# Patient Record
Sex: MALE | Race: BLACK OR AFRICAN AMERICAN | NOT HISPANIC OR LATINO | Employment: UNEMPLOYED | URBAN - METROPOLITAN AREA
[De-identification: names, ages, dates, MRNs, and addresses within clinical notes are randomized per-mention and may not be internally consistent; named-entity substitution may affect disease eponyms.]

---

## 2018-10-23 ENCOUNTER — OFFICE VISIT (OUTPATIENT)
Dept: FAMILY MEDICINE CLINIC | Facility: CLINIC | Age: 38
End: 2018-10-23
Payer: COMMERCIAL

## 2018-10-23 VITALS
OXYGEN SATURATION: 98 % | SYSTOLIC BLOOD PRESSURE: 130 MMHG | HEIGHT: 73 IN | DIASTOLIC BLOOD PRESSURE: 80 MMHG | RESPIRATION RATE: 16 BRPM | BODY MASS INDEX: 24.92 KG/M2 | WEIGHT: 188 LBS | HEART RATE: 74 BPM

## 2018-10-23 DIAGNOSIS — B07.0 PLANTAR WART OF BOTH FEET: Primary | ICD-10-CM

## 2018-10-23 DIAGNOSIS — Z00.00 WELL ADULT EXAM: ICD-10-CM

## 2018-10-23 PROBLEM — M54.6 CHRONIC RIGHT-SIDED THORACIC BACK PAIN: Status: ACTIVE | Noted: 2017-08-04

## 2018-10-23 PROBLEM — G89.29 CHRONIC RIGHT-SIDED THORACIC BACK PAIN: Status: ACTIVE | Noted: 2017-08-04

## 2018-10-23 PROCEDURE — 3008F BODY MASS INDEX DOCD: CPT | Performed by: FAMILY MEDICINE

## 2018-10-23 PROCEDURE — 99204 OFFICE O/P NEW MOD 45 MIN: CPT | Performed by: FAMILY MEDICINE

## 2018-10-23 RX ORDER — MELOXICAM 7.5 MG/1
7.5 TABLET ORAL DAILY
COMMUNITY
Start: 2018-01-31 | End: 2019-07-18 | Stop reason: ALTCHOICE
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
## 2018-12-13 ENCOUNTER — OFFICE VISIT (OUTPATIENT)
Dept: FAMILY MEDICINE CLINIC | Facility: CLINIC | Age: 38
End: 2018-12-13
Payer: COMMERCIAL

## 2018-12-13 VITALS
DIASTOLIC BLOOD PRESSURE: 80 MMHG | HEIGHT: 73 IN | SYSTOLIC BLOOD PRESSURE: 150 MMHG | BODY MASS INDEX: 24.65 KG/M2 | TEMPERATURE: 98.2 F | WEIGHT: 186 LBS

## 2018-12-13 DIAGNOSIS — J06.9 URTI (ACUTE UPPER RESPIRATORY INFECTION): Primary | ICD-10-CM

## 2018-12-13 PROCEDURE — 99213 OFFICE O/P EST LOW 20 MIN: CPT | Performed by: FAMILY MEDICINE

## 2018-12-13 PROCEDURE — 3008F BODY MASS INDEX DOCD: CPT | Performed by: FAMILY MEDICINE

## 2018-12-13 RX ORDER — CEFUROXIME AXETIL 500 MG/1
500 TABLET ORAL EVERY 12 HOURS SCHEDULED
Qty: 14 TABLET | Refills: 0 | Status: SHIPPED | OUTPATIENT
Start: 2018-12-13 | End: 2018-12-20
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
## 2019-07-18 ENCOUNTER — OFFICE VISIT (OUTPATIENT)
Dept: FAMILY MEDICINE CLINIC | Facility: CLINIC | Age: 39
End: 2019-07-18
Payer: COMMERCIAL

## 2019-07-18 VITALS
OXYGEN SATURATION: 98 % | RESPIRATION RATE: 16 BRPM | HEIGHT: 73 IN | SYSTOLIC BLOOD PRESSURE: 118 MMHG | DIASTOLIC BLOOD PRESSURE: 72 MMHG | TEMPERATURE: 99.7 F | BODY MASS INDEX: 25.69 KG/M2 | HEART RATE: 68 BPM | WEIGHT: 193.8 LBS

## 2019-07-18 DIAGNOSIS — L30.9 DERMATITIS: ICD-10-CM

## 2019-07-18 DIAGNOSIS — S61.411A LACERATION OF RIGHT HAND WITHOUT FOREIGN BODY, INITIAL ENCOUNTER: Primary | ICD-10-CM

## 2019-07-18 PROCEDURE — 3008F BODY MASS INDEX DOCD: CPT | Performed by: FAMILY MEDICINE

## 2019-07-18 PROCEDURE — 99214 OFFICE O/P EST MOD 30 MIN: CPT | Performed by: FAMILY MEDICINE

## 2019-07-18 RX ORDER — BETAMETHASONE DIPROPIONATE 0.5 MG/G
OINTMENT TOPICAL 2 TIMES DAILY
COMMUNITY
End: 2019-07-18 | Stop reason: SDUPTHER

## 2019-07-18 RX ORDER — CEPHALEXIN 500 MG/1
500 CAPSULE ORAL EVERY 12 HOURS SCHEDULED
Qty: 10 CAPSULE | Refills: 0 | Status: SHIPPED | OUTPATIENT
Start: 2019-07-18 | End: 2019-07-23

## 2019-07-18 RX ORDER — BETAMETHASONE DIPROPIONATE 0.5 MG/G
CREAM TOPICAL 2 TIMES DAILY
Qty: 45 G | Refills: 0 | Status: SHIPPED | OUTPATIENT
Start: 2019-07-18 | End: 2021-12-08 | Stop reason: ALTCHOICE

## 2019-07-18 RX ORDER — GABAPENTIN 100 MG/1
300 CAPSULE ORAL
COMMUNITY
End: 2021-12-08 | Stop reason: ALTCHOICE

## 2019-07-18 NOTE — LETTER
July 18, 2019     Patient: Crista Hawk   YOB: 1980   Date of Visit: 7/18/2019       To Whom it May Concern:    Crista Hawk is under my professional care  He was seen in my office on 7/18/2019  He may return to work on July 19, 2019  If you have any questions or concerns, please don't hesitate to call           Sincerely,          Jennifer Montilla, DO        CC: No Recipients

## 2019-07-18 NOTE — PROGRESS NOTES
Assessment/Plan:  Applied Betadine and sterile dressing to the wound  Home instructions given  He will follow up here if not a lot better in 5 days  Diagnoses and all orders for this visit:    Laceration of right hand without foreign body, initial encounter    Other orders  -     gabapentin (NEURONTIN) 100 mg capsule; Take 300 mg by mouth daily at bedtime  -     betamethasone, augmented, (DIPROLENE) 0 05 % ointment; Apply topically 2 (two) times a day          Subjective:      Patient ID: Jonny Bautista is a 41 y o  male  Patient presents with:  Laceration: Patient presents to the office today for c/o a laceration on his right hand with scissors on Sunday  Patient has been treating at home  No pain, until the past 24 hours and he noticed swelling, and tighness  Rash: Patient also here for c/o rash, he feels it is heat rash  He has had this before and would like a script for Betamethasone Ointment  He has had this in the past but is out now  Health Maintenance: Chart says he is due for Pneumo Vaccine and Tetanus shot  The following portions of the patient's history were reviewed and updated as appropriate: allergies, current medications, past family history, past medical history, past social history, past surgical history and problem list     Review of Systems   Constitutional: Negative  HENT: Negative  Eyes: Negative  Respiratory: Negative  Cardiovascular: Negative  Gastrointestinal: Negative  Endocrine: Negative  Genitourinary: Negative  Musculoskeletal: Negative  Skin: Positive for wound  Allergic/Immunologic: Negative  Neurological: Negative  Hematological: Negative  Psychiatric/Behavioral: Negative  All other systems reviewed and are negative          Objective:      /72 (BP Location: Left arm, Patient Position: Sitting, Cuff Size: Adult)   Pulse 68   Temp 99 7 °F (37 6 °C) (Tympanic)   Resp 16   Ht 6' 1" (1 854 m)   Wt 87 9 kg (193 lb 12 8 oz)   SpO2 98%   BMI 25 57 kg/m²          Physical Exam   Musculoskeletal:   There is swelling but no tenderness in the right hand  There is laceration over the hyper thenar eminence of the right hand  No erythema no discharge

## 2019-07-22 RX ORDER — BETAMETHASONE DIPROPIONATE 0.5 MG/G
OINTMENT TOPICAL 2 TIMES DAILY
Qty: 30 G | Refills: 1 | Status: SHIPPED | OUTPATIENT
Start: 2019-07-22 | End: 2021-12-08 | Stop reason: ALTCHOICE

## 2020-03-31 ENCOUNTER — TELEPHONE (OUTPATIENT)
Dept: FAMILY MEDICINE CLINIC | Facility: CLINIC | Age: 40
End: 2020-03-31

## 2020-03-31 ENCOUNTER — TELEMEDICINE (OUTPATIENT)
Dept: FAMILY MEDICINE CLINIC | Facility: CLINIC | Age: 40
End: 2020-03-31
Payer: COMMERCIAL

## 2020-03-31 DIAGNOSIS — B34.9 VIRAL SYNDROME: Primary | ICD-10-CM

## 2020-03-31 PROCEDURE — 99213 OFFICE O/P EST LOW 20 MIN: CPT | Performed by: FAMILY MEDICINE

## 2020-03-31 NOTE — LETTER
March 31, 2020     Patient: Urvashi Valderrama   YOB: 1980   Date of Visit: 3/31/2020       To Whom it May Concern:    Urvashi Valderrama is under my professional care  He was seen in my office on 3/31/2020  He may return to work on Tuesday April 7, 2020  If you have any questions or concerns, please don't hesitate to call           Sincerely,          Charmayne Altes,         CC: No Recipients

## 2020-03-31 NOTE — TELEPHONE ENCOUNTER
COVID-19 Phone Call Triage    Yin Erm called stating that they are having SOB, Fatigue, Body Aches and Chills  Yin De Los Santos has not traveled outside the [place]  within the last 14 days  Latrell Lehman has not been around any one ill or exposed to COVID-19  Please call patient to triage
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
## 2020-04-23 ENCOUNTER — TELEMEDICINE (OUTPATIENT)
Dept: FAMILY MEDICINE CLINIC | Facility: CLINIC | Age: 40
End: 2020-04-23
Payer: COMMERCIAL

## 2020-04-23 DIAGNOSIS — Z20.828 EXPOSURE TO SARS-ASSOCIATED CORONAVIRUS: ICD-10-CM

## 2020-04-23 DIAGNOSIS — B34.9 VIRAL SYNDROME: Primary | ICD-10-CM

## 2020-04-23 PROCEDURE — 87635 SARS-COV-2 COVID-19 AMP PRB: CPT

## 2020-04-23 PROCEDURE — 99212 OFFICE O/P EST SF 10 MIN: CPT | Performed by: FAMILY MEDICINE

## 2020-04-23 RX ORDER — FLUTICASONE PROPIONATE 50 MCG
2 SPRAY, SUSPENSION (ML) NASAL DAILY
Qty: 1 BOTTLE | Refills: 0 | Status: SHIPPED | OUTPATIENT
Start: 2020-04-23 | End: 2020-05-19

## 2020-04-23 RX ORDER — ALBUTEROL SULFATE 90 UG/1
2 AEROSOL, METERED RESPIRATORY (INHALATION) EVERY 4 HOURS PRN
Qty: 1 INHALER | Refills: 0 | Status: SHIPPED | OUTPATIENT
Start: 2020-04-23 | End: 2021-12-08 | Stop reason: ALTCHOICE

## 2020-04-24 LAB — SARS-COV-2 RNA SPEC QL NAA+PROBE: NOT DETECTED

## 2020-05-17 DIAGNOSIS — B34.9 VIRAL SYNDROME: ICD-10-CM

## 2020-05-19 RX ORDER — FLUTICASONE PROPIONATE 50 MCG
SPRAY, SUSPENSION (ML) NASAL
Qty: 16 ML | Refills: 0 | Status: SHIPPED | OUTPATIENT
Start: 2020-05-19 | End: 2021-12-08 | Stop reason: ALTCHOICE

## 2020-10-13 ENCOUNTER — OFFICE VISIT (OUTPATIENT)
Dept: FAMILY MEDICINE CLINIC | Facility: CLINIC | Age: 40
End: 2020-10-13
Payer: COMMERCIAL

## 2020-10-13 VITALS
BODY MASS INDEX: 25.89 KG/M2 | WEIGHT: 196.2 LBS | HEART RATE: 76 BPM | DIASTOLIC BLOOD PRESSURE: 82 MMHG | SYSTOLIC BLOOD PRESSURE: 132 MMHG

## 2020-10-13 DIAGNOSIS — R53.83 FATIGUE, UNSPECIFIED TYPE: ICD-10-CM

## 2020-10-13 DIAGNOSIS — J30.2 SEASONAL ALLERGIES: ICD-10-CM

## 2020-10-13 DIAGNOSIS — Z00.00 WELL ADULT EXAM: Primary | ICD-10-CM

## 2020-10-13 DIAGNOSIS — N42.9 PROSTATE DISORDER: ICD-10-CM

## 2020-10-13 PROBLEM — B34.9 VIRAL SYNDROME: Status: RESOLVED | Noted: 2020-03-31 | Resolved: 2020-10-13

## 2020-10-13 PROBLEM — J06.9 URTI (ACUTE UPPER RESPIRATORY INFECTION): Status: RESOLVED | Noted: 2018-12-13 | Resolved: 2020-10-13

## 2020-10-13 PROBLEM — M54.6 CHRONIC RIGHT-SIDED THORACIC BACK PAIN: Status: RESOLVED | Noted: 2017-08-04 | Resolved: 2020-10-13

## 2020-10-13 PROBLEM — S61.411A LACERATION OF RIGHT HAND WITHOUT FOREIGN BODY: Status: RESOLVED | Noted: 2019-07-18 | Resolved: 2020-10-13

## 2020-10-13 PROBLEM — L30.9 DERMATITIS: Status: RESOLVED | Noted: 2019-07-18 | Resolved: 2020-10-13

## 2020-10-13 PROBLEM — G89.29 CHRONIC RIGHT-SIDED THORACIC BACK PAIN: Status: RESOLVED | Noted: 2017-08-04 | Resolved: 2020-10-13

## 2020-10-13 PROCEDURE — 99214 OFFICE O/P EST MOD 30 MIN: CPT | Performed by: FAMILY MEDICINE

## 2020-10-13 PROCEDURE — 4004F PT TOBACCO SCREEN RCVD TLK: CPT | Performed by: FAMILY MEDICINE

## 2020-10-13 PROCEDURE — 3725F SCREEN DEPRESSION PERFORMED: CPT | Performed by: FAMILY MEDICINE

## 2020-10-14 ENCOUNTER — APPOINTMENT (OUTPATIENT)
Dept: LAB | Facility: HOSPITAL | Age: 40
End: 2020-10-14
Payer: COMMERCIAL

## 2020-10-14 DIAGNOSIS — J30.2 SEASONAL ALLERGIES: ICD-10-CM

## 2020-10-14 LAB
ALBUMIN SERPL BCP-MCNC: 4.2 G/DL (ref 3.5–5)
ALP SERPL-CCNC: 88 U/L (ref 46–116)
ALT SERPL W P-5'-P-CCNC: 37 U/L (ref 12–78)
ANION GAP SERPL CALCULATED.3IONS-SCNC: 7 MMOL/L (ref 4–13)
AST SERPL W P-5'-P-CCNC: 21 U/L (ref 5–45)
BASOPHILS # BLD AUTO: 0.06 THOUSANDS/ΜL (ref 0–0.1)
BASOPHILS NFR BLD AUTO: 1 % (ref 0–1)
BILIRUB SERPL-MCNC: 0.4 MG/DL (ref 0.2–1)
BILIRUB UR QL STRIP: NEGATIVE
BUN SERPL-MCNC: 14 MG/DL (ref 5–25)
CALCIUM SERPL-MCNC: 9.4 MG/DL (ref 8.3–10.1)
CHLORIDE SERPL-SCNC: 104 MMOL/L (ref 100–108)
CHOLEST SERPL-MCNC: 197 MG/DL (ref 50–200)
CLARITY UR: NORMAL
CO2 SERPL-SCNC: 27 MMOL/L (ref 21–32)
COLOR UR: YELLOW
CREAT SERPL-MCNC: 1.24 MG/DL (ref 0.6–1.3)
EOSINOPHIL # BLD AUTO: 0.63 THOUSAND/ΜL (ref 0–0.61)
EOSINOPHIL NFR BLD AUTO: 8 % (ref 0–6)
ERYTHROCYTE [DISTWIDTH] IN BLOOD BY AUTOMATED COUNT: 12.6 % (ref 11.6–15.1)
FERRITIN SERPL-MCNC: 102 NG/ML (ref 8–388)
GFR SERPL CREATININE-BSD FRML MDRD: 84 ML/MIN/1.73SQ M
GLUCOSE P FAST SERPL-MCNC: 102 MG/DL (ref 65–99)
GLUCOSE UR STRIP-MCNC: NEGATIVE MG/DL
HCT VFR BLD AUTO: 42.2 % (ref 36.5–49.3)
HDLC SERPL-MCNC: 45 MG/DL
HGB BLD-MCNC: 13.9 G/DL (ref 12–17)
HGB UR QL STRIP.AUTO: NEGATIVE
IMM GRANULOCYTES # BLD AUTO: 0.03 THOUSAND/UL (ref 0–0.2)
IMM GRANULOCYTES NFR BLD AUTO: 0 % (ref 0–2)
IRON SERPL-MCNC: 70 UG/DL (ref 65–175)
KETONES UR STRIP-MCNC: NEGATIVE MG/DL
LDLC SERPL CALC-MCNC: 132 MG/DL (ref 0–100)
LEUKOCYTE ESTERASE UR QL STRIP: NEGATIVE
LYMPHOCYTES # BLD AUTO: 2.55 THOUSANDS/ΜL (ref 0.6–4.47)
LYMPHOCYTES NFR BLD AUTO: 31 % (ref 14–44)
MCH RBC QN AUTO: 31.2 PG (ref 26.8–34.3)
MCHC RBC AUTO-ENTMCNC: 32.9 G/DL (ref 31.4–37.4)
MCV RBC AUTO: 95 FL (ref 82–98)
MONOCYTES # BLD AUTO: 0.55 THOUSAND/ΜL (ref 0.17–1.22)
MONOCYTES NFR BLD AUTO: 7 % (ref 4–12)
NEUTROPHILS # BLD AUTO: 4.37 THOUSANDS/ΜL (ref 1.85–7.62)
NEUTS SEG NFR BLD AUTO: 53 % (ref 43–75)
NITRITE UR QL STRIP: NEGATIVE
NONHDLC SERPL-MCNC: 152 MG/DL
NRBC BLD AUTO-RTO: 0 /100 WBCS
PH UR STRIP.AUTO: 6 [PH]
PLATELET # BLD AUTO: 230 THOUSANDS/UL (ref 149–390)
PMV BLD AUTO: 10.7 FL (ref 8.9–12.7)
POTASSIUM SERPL-SCNC: 4.2 MMOL/L (ref 3.5–5.3)
PROT SERPL-MCNC: 7.7 G/DL (ref 6.4–8.2)
PROT UR STRIP-MCNC: NEGATIVE MG/DL
PSA SERPL-MCNC: 1.5 NG/ML (ref 0–4)
RBC # BLD AUTO: 4.45 MILLION/UL (ref 3.88–5.62)
SODIUM SERPL-SCNC: 138 MMOL/L (ref 136–145)
SP GR UR STRIP.AUTO: 1.02 (ref 1–1.03)
TIBC SERPL-MCNC: 294 UG/DL (ref 250–450)
TRIGL SERPL-MCNC: 101 MG/DL
TSH SERPL DL<=0.05 MIU/L-ACNC: 1.78 UIU/ML (ref 0.36–3.74)
UROBILINOGEN UR QL STRIP.AUTO: 0.2 E.U./DL
WBC # BLD AUTO: 8.19 THOUSAND/UL (ref 4.31–10.16)

## 2020-10-14 PROCEDURE — 82785 ASSAY OF IGE: CPT

## 2020-10-14 PROCEDURE — 82728 ASSAY OF FERRITIN: CPT

## 2020-10-14 PROCEDURE — 83540 ASSAY OF IRON: CPT

## 2020-10-14 PROCEDURE — 80061 LIPID PANEL: CPT | Performed by: FAMILY MEDICINE

## 2020-10-14 PROCEDURE — 86003 ALLG SPEC IGE CRUDE XTRC EA: CPT

## 2020-10-14 PROCEDURE — 36415 COLL VENOUS BLD VENIPUNCTURE: CPT | Performed by: FAMILY MEDICINE

## 2020-10-14 PROCEDURE — 84153 ASSAY OF PSA TOTAL: CPT | Performed by: FAMILY MEDICINE

## 2020-10-14 PROCEDURE — 85025 COMPLETE CBC W/AUTO DIFF WBC: CPT | Performed by: FAMILY MEDICINE

## 2020-10-14 PROCEDURE — 80053 COMPREHEN METABOLIC PANEL: CPT | Performed by: FAMILY MEDICINE

## 2020-10-14 PROCEDURE — 81003 URINALYSIS AUTO W/O SCOPE: CPT | Performed by: FAMILY MEDICINE

## 2020-10-14 PROCEDURE — 83550 IRON BINDING TEST: CPT

## 2020-10-14 PROCEDURE — 84443 ASSAY THYROID STIM HORMONE: CPT | Performed by: FAMILY MEDICINE

## 2020-10-15 LAB
A ALTERNATA IGE QN: <0.1 KUA/I
A FUMIGATUS IGE QN: 0.24 KUA/I
ALLERGEN COMMENT: ABNORMAL
BERMUDA GRASS IGE QN: 0.27 KUA/I
BOXELDER IGE QN: <0.1 KUA/I
C HERBARUM IGE QN: <0.1 KUA/I
CAT DANDER IGE QN: <0.1 KUA/I
CMN PIGWEED IGE QN: <0.1 KUA/I
COMMON RAGWEED IGE QN: 1.79 KUA/I
COTTONWOOD IGE QN: 0.11 KUA/I
D FARINAE IGE QN: 1.15 KUA/I
D PTERONYSS IGE QN: 0.63 KUA/I
DOG DANDER IGE QN: 0.12 KUA/I
LONDON PLANE IGE QN: 0.15 KUA/I
MOUSE URINE PROT IGE QN: <0.1 KUA/I
MT JUNIPER IGE QN: <0.1 KUA/I
MUGWORT IGE QN: 1.17 KUA/I
P NOTATUM IGE QN: <0.1 KUA/I
ROACH IGE QN: 1.66 KUA/I
SHEEP SORREL IGE QN: <0.1 KUA/I
SILVER BIRCH IGE QN: <0.1 KUA/I
TIMOTHY IGE QN: 3.74 KUA/I
TOTAL IGE SMQN RAST: 106 KU/L (ref 0–113)
WALNUT IGE QN: 0.55 KUA/I
WHITE ASH IGE QN: 0.2 KUA/I
WHITE ELM IGE QN: 0.17 KUA/I
WHITE MULBERRY IGE QN: <0.1 KUA/I
WHITE OAK IGE QN: <0.1 KUA/I

## 2020-10-22 ENCOUNTER — TELEPHONE (OUTPATIENT)
Dept: FAMILY MEDICINE CLINIC | Facility: CLINIC | Age: 40
End: 2020-10-22

## 2020-10-22 DIAGNOSIS — Z00.00 WELL ADULT EXAM: Primary | ICD-10-CM

## 2021-12-08 ENCOUNTER — OFFICE VISIT (OUTPATIENT)
Dept: FAMILY MEDICINE CLINIC | Facility: CLINIC | Age: 41
End: 2021-12-08
Payer: COMMERCIAL

## 2021-12-08 ENCOUNTER — TELEPHONE (OUTPATIENT)
Dept: FAMILY MEDICINE CLINIC | Facility: CLINIC | Age: 41
End: 2021-12-08

## 2021-12-08 ENCOUNTER — APPOINTMENT (OUTPATIENT)
Dept: LAB | Facility: HOSPITAL | Age: 41
End: 2021-12-08
Payer: COMMERCIAL

## 2021-12-08 ENCOUNTER — TELEPHONE (OUTPATIENT)
Dept: PSYCHIATRY | Facility: CLINIC | Age: 41
End: 2021-12-08

## 2021-12-08 VITALS
TEMPERATURE: 96.9 F | HEIGHT: 73 IN | HEART RATE: 55 BPM | OXYGEN SATURATION: 98 % | BODY MASS INDEX: 26.51 KG/M2 | SYSTOLIC BLOOD PRESSURE: 110 MMHG | DIASTOLIC BLOOD PRESSURE: 80 MMHG | WEIGHT: 200 LBS

## 2021-12-08 DIAGNOSIS — Z11.4 SCREENING FOR HIV (HUMAN IMMUNODEFICIENCY VIRUS): ICD-10-CM

## 2021-12-08 DIAGNOSIS — Z12.11 SCREENING FOR COLORECTAL CANCER: ICD-10-CM

## 2021-12-08 DIAGNOSIS — Z12.12 SCREENING FOR COLORECTAL CANCER: ICD-10-CM

## 2021-12-08 DIAGNOSIS — F33.1 MODERATE EPISODE OF RECURRENT MAJOR DEPRESSIVE DISORDER (HCC): ICD-10-CM

## 2021-12-08 DIAGNOSIS — Z00.00 ANNUAL PHYSICAL EXAM: Primary | ICD-10-CM

## 2021-12-08 DIAGNOSIS — Z11.59 NEED FOR HEPATITIS C SCREENING TEST: ICD-10-CM

## 2021-12-08 DIAGNOSIS — Z00.00 ANNUAL PHYSICAL EXAM: ICD-10-CM

## 2021-12-08 LAB
25(OH)D3 SERPL-MCNC: 11.2 NG/ML (ref 30–100)
ALBUMIN SERPL BCP-MCNC: 4.7 G/DL (ref 3.5–5)
ALP SERPL-CCNC: 81 U/L (ref 46–116)
ALT SERPL W P-5'-P-CCNC: 33 U/L (ref 12–78)
ANION GAP SERPL CALCULATED.3IONS-SCNC: 10 MMOL/L (ref 4–13)
AST SERPL W P-5'-P-CCNC: 16 U/L (ref 5–45)
BILIRUB SERPL-MCNC: 0.42 MG/DL (ref 0.2–1)
BUN SERPL-MCNC: 11 MG/DL (ref 5–25)
CALCIUM SERPL-MCNC: 9.2 MG/DL (ref 8.3–10.1)
CHLORIDE SERPL-SCNC: 103 MMOL/L (ref 100–108)
CHOLEST SERPL-MCNC: 202 MG/DL
CO2 SERPL-SCNC: 26 MMOL/L (ref 21–32)
CREAT SERPL-MCNC: 1.21 MG/DL (ref 0.6–1.3)
ERYTHROCYTE [DISTWIDTH] IN BLOOD BY AUTOMATED COUNT: 12.5 % (ref 11.6–15.1)
GFR SERPL CREATININE-BSD FRML MDRD: 86 ML/MIN/1.73SQ M
GLUCOSE P FAST SERPL-MCNC: 97 MG/DL (ref 65–99)
HCT VFR BLD AUTO: 42.4 % (ref 36.5–49.3)
HCV AB SER QL: NORMAL
HDLC SERPL-MCNC: 35 MG/DL
HGB BLD-MCNC: 14.4 G/DL (ref 12–17)
LDLC SERPL CALC-MCNC: 133 MG/DL (ref 0–100)
MCH RBC QN AUTO: 32.1 PG (ref 26.8–34.3)
MCHC RBC AUTO-ENTMCNC: 34 G/DL (ref 31.4–37.4)
MCV RBC AUTO: 94 FL (ref 82–98)
NONHDLC SERPL-MCNC: 167 MG/DL
PLATELET # BLD AUTO: 213 THOUSANDS/UL (ref 149–390)
PMV BLD AUTO: 10.7 FL (ref 8.9–12.7)
POTASSIUM SERPL-SCNC: 4 MMOL/L (ref 3.5–5.3)
PROT SERPL-MCNC: 7.7 G/DL (ref 6.4–8.2)
RBC # BLD AUTO: 4.49 MILLION/UL (ref 3.88–5.62)
SODIUM SERPL-SCNC: 139 MMOL/L (ref 136–145)
TRIGL SERPL-MCNC: 168 MG/DL
TSH SERPL DL<=0.05 MIU/L-ACNC: 1.95 UIU/ML (ref 0.36–3.74)
WBC # BLD AUTO: 6.64 THOUSAND/UL (ref 4.31–10.16)

## 2021-12-08 PROCEDURE — 4004F PT TOBACCO SCREEN RCVD TLK: CPT | Performed by: FAMILY MEDICINE

## 2021-12-08 PROCEDURE — 3008F BODY MASS INDEX DOCD: CPT | Performed by: FAMILY MEDICINE

## 2021-12-08 PROCEDURE — 80061 LIPID PANEL: CPT

## 2021-12-08 PROCEDURE — 3725F SCREEN DEPRESSION PERFORMED: CPT | Performed by: FAMILY MEDICINE

## 2021-12-08 PROCEDURE — 82306 VITAMIN D 25 HYDROXY: CPT

## 2021-12-08 PROCEDURE — 84443 ASSAY THYROID STIM HORMONE: CPT

## 2021-12-08 PROCEDURE — 99396 PREV VISIT EST AGE 40-64: CPT | Performed by: FAMILY MEDICINE

## 2021-12-08 PROCEDURE — 99214 OFFICE O/P EST MOD 30 MIN: CPT | Performed by: FAMILY MEDICINE

## 2021-12-08 PROCEDURE — 87389 HIV-1 AG W/HIV-1&-2 AB AG IA: CPT

## 2021-12-08 PROCEDURE — 85027 COMPLETE CBC AUTOMATED: CPT

## 2021-12-08 PROCEDURE — 86803 HEPATITIS C AB TEST: CPT

## 2021-12-08 PROCEDURE — 36415 COLL VENOUS BLD VENIPUNCTURE: CPT

## 2021-12-08 PROCEDURE — 80053 COMPREHEN METABOLIC PANEL: CPT

## 2021-12-08 RX ORDER — DULOXETIN HYDROCHLORIDE 60 MG/1
60 CAPSULE, DELAYED RELEASE ORAL DAILY
Qty: 30 CAPSULE | Refills: 5 | Status: SHIPPED | OUTPATIENT
Start: 2021-12-08 | End: 2022-01-11 | Stop reason: SINTOL

## 2021-12-09 DIAGNOSIS — E55.9 VITAMIN D DEFICIENCY: Primary | ICD-10-CM

## 2021-12-09 DIAGNOSIS — M79.671 RIGHT FOOT PAIN: Primary | ICD-10-CM

## 2021-12-09 LAB — HIV 1+2 AB+HIV1 P24 AG SERPL QL IA: NORMAL

## 2022-01-11 ENCOUNTER — OFFICE VISIT (OUTPATIENT)
Dept: FAMILY MEDICINE CLINIC | Facility: CLINIC | Age: 42
End: 2022-01-11
Payer: COMMERCIAL

## 2022-01-11 ENCOUNTER — TELEPHONE (OUTPATIENT)
Dept: FAMILY MEDICINE CLINIC | Facility: CLINIC | Age: 42
End: 2022-01-11

## 2022-01-11 VITALS
DIASTOLIC BLOOD PRESSURE: 70 MMHG | WEIGHT: 199 LBS | SYSTOLIC BLOOD PRESSURE: 110 MMHG | HEIGHT: 73 IN | OXYGEN SATURATION: 99 % | TEMPERATURE: 98.3 F | BODY MASS INDEX: 26.37 KG/M2 | HEART RATE: 61 BPM

## 2022-01-11 DIAGNOSIS — Z12.12 SCREENING FOR COLORECTAL CANCER: Primary | ICD-10-CM

## 2022-01-11 DIAGNOSIS — F98.8 ATTENTION DEFICIT DISORDER (ADD) IN ADULT: Primary | ICD-10-CM

## 2022-01-11 DIAGNOSIS — E55.9 VITAMIN D DEFICIENCY: Primary | ICD-10-CM

## 2022-01-11 DIAGNOSIS — M79.672 FOOT PAIN, BILATERAL: ICD-10-CM

## 2022-01-11 DIAGNOSIS — M79.671 FOOT PAIN, BILATERAL: ICD-10-CM

## 2022-01-11 DIAGNOSIS — Z12.11 SCREENING FOR COLORECTAL CANCER: Primary | ICD-10-CM

## 2022-01-11 DIAGNOSIS — E55.9 VITAMIN D DEFICIENCY: ICD-10-CM

## 2022-01-11 PROCEDURE — 3008F BODY MASS INDEX DOCD: CPT | Performed by: FAMILY MEDICINE

## 2022-01-11 PROCEDURE — 99214 OFFICE O/P EST MOD 30 MIN: CPT | Performed by: FAMILY MEDICINE

## 2022-01-11 RX ORDER — ERGOCALCIFEROL 1.25 MG/1
50000 CAPSULE ORAL WEEKLY
Qty: 4 CAPSULE | Refills: 5 | Status: SHIPPED | OUTPATIENT
Start: 2022-01-11 | End: 2022-02-08 | Stop reason: SDUPTHER

## 2022-01-11 RX ORDER — DEXTROAMPHETAMINE SACCHARATE, AMPHETAMINE ASPARTATE, DEXTROAMPHETAMINE SULFATE AND AMPHETAMINE SULFATE 2.5; 2.5; 2.5; 2.5 MG/1; MG/1; MG/1; MG/1
10 TABLET ORAL DAILY
Qty: 30 TABLET | Refills: 0 | Status: SHIPPED | OUTPATIENT
Start: 2022-01-11 | End: 2022-02-08 | Stop reason: DRUGHIGH

## 2022-01-11 NOTE — TELEPHONE ENCOUNTER
Patient would like vitamin D prescribed 50,000 ut once a week so the insurance could cover it  He was prescribed currently 10,000 ut daily

## 2022-01-11 NOTE — PROGRESS NOTES
Assessment/Plan: For foot pain will have follow-up with Podiatry  He has seen them in the past     We discussed treating the attention deficit disorder which may also be part of his depression  Start with Adderall 10 milligrams daily I cautioned him  About its use  Will follow-up in 4 weeks  Continue vitamin-D replacement  Will recheck labs   Diagnoses and all orders for this visit:    Attention deficit disorder (ADD) in adult  -     amphetamine-dextroamphetamine (ADDERALL, 10MG,) 10 mg tablet; Take 1 tablet (10 mg total) by mouth daily Max Daily Amount: 10 mg    Vitamin D deficiency  -     cholecalciferol (VITAMIN D3) 250 MCG (40733 UT) capsule; Take 1 capsule (10,000 Units total) by mouth daily    Foot pain, bilateral  -     Ambulatory Referral to Podiatry; Future          Subjective:     Patient ID: Stefani Rios is a 37 y o  male  Still complains of bilateral foot pain in the side of his left the medial aspect in the big toe right foot  He does not recall any trauma to the feet  He did have some labs recently  Blood sugar was normal       Review of Systems   Constitutional: Negative  HENT: Negative  Eyes: Negative  Respiratory: Negative  Cardiovascular: Negative  Gastrointestinal: Negative  Endocrine: Negative  Genitourinary: Negative  Musculoskeletal: Negative  As noted in HPI  Skin: Negative  Allergic/Immunologic: Negative  Neurological: Negative  Hematological: Negative  Psychiatric/Behavioral: Negative  All other systems reviewed and are negative  Objective:     Physical Exam  Vitals and nursing note reviewed  Constitutional:       Appearance: He is well-developed  HENT:      Head: Normocephalic and atraumatic  Right Ear: External ear normal       Left Ear: External ear normal       Nose: Nose normal    Eyes:      Conjunctiva/sclera: Conjunctivae normal       Pupils: Pupils are equal, round, and reactive to light  Cardiovascular:      Rate and Rhythm: Normal rate and regular rhythm  Heart sounds: Normal heart sounds  Pulmonary:      Effort: Pulmonary effort is normal       Breath sounds: Normal breath sounds  Abdominal:      General: Bowel sounds are normal       Palpations: Abdomen is soft  Musculoskeletal:         General: Normal range of motion  Cervical back: Normal range of motion and neck supple  Skin:     General: Skin is warm and dry  Neurological:      Mental Status: He is alert and oriented to person, place, and time  Deep Tendon Reflexes: Reflexes are normal and symmetric     Psychiatric:         Behavior: Behavior normal

## 2022-02-04 ENCOUNTER — OFFICE VISIT (OUTPATIENT)
Dept: PODIATRY | Facility: CLINIC | Age: 42
End: 2022-02-04
Payer: COMMERCIAL

## 2022-02-04 VITALS
SYSTOLIC BLOOD PRESSURE: 143 MMHG | BODY MASS INDEX: 27.09 KG/M2 | WEIGHT: 200 LBS | HEIGHT: 72 IN | DIASTOLIC BLOOD PRESSURE: 84 MMHG | HEART RATE: 62 BPM

## 2022-02-04 DIAGNOSIS — M79.672 FOOT PAIN, BILATERAL: ICD-10-CM

## 2022-02-04 DIAGNOSIS — M72.2 PLANTAR FASCIITIS, LEFT: ICD-10-CM

## 2022-02-04 DIAGNOSIS — L60.8 PINCER NAIL DEFORMITY: Primary | ICD-10-CM

## 2022-02-04 DIAGNOSIS — M79.671 FOOT PAIN, BILATERAL: ICD-10-CM

## 2022-02-04 PROCEDURE — 99243 OFF/OP CNSLTJ NEW/EST LOW 30: CPT | Performed by: PODIATRIST

## 2022-02-04 NOTE — PATIENT INSTRUCTIONS
Plantar Fasciitis Exercises   WHAT YOU NEED TO KNOW:   Plantar fasciitis exercises help stretch your plantar fascia, calf muscles, and Achilles tendon  They also help strengthen the muscles that support your heel and foot  Exercises and stretching can help prevent plantar fasciitis from getting worse or coming back  DISCHARGE INSTRUCTIONS:   Contact your healthcare provider if:   · Your pain and swelling increase  · You develop new knee, hip, or back pain  · You have questions or concerns about your condition or care  How to do plantar fasciitis exercises:  Ask your healthcare provider when to start these exercises and how often to do them  · Slant board stretch:  Stand on a slanted board with your toes higher than your heel  Press your heel into the board  Keep your knee slightly bent  Hold this position for 1 minute  Repeat 5 times  · Heel stretch:  Stand up straight with your hands on a wall  Place your injured leg slightly behind your other leg  Keep your heels flat on the floor, lean forward, and bend both knees  Hold for 30 seconds  · Calf stretch:  Stand up straight with your hands on a wall  Step forward so that your uninjured foot is in front of your injured foot  Keep your front leg bent and your back leg straight  Gently lean forward until you feel your calf stretch  Hold for 30 seconds and then relax  · Seated plantar fascia stretch:  Sit on a firm surface, such as the floor or a mat  Extend your legs out in front of you  Raise your injured foot a few inches off the ground  Keep your leg straight  Grab the toes of your injured foot and pull them toward you  With your other hand, feel your plantar fascia  You should feel it press outward  Hold for 30 seconds  If you cannot reach your toes, loop a towel or tie around your foot  Gently pull on the towel or tie and flex your toes toward you  · Heel raises:  Stand on the injured leg   Raise your other leg off the ground  Hold onto a railing or wall for balance  Slowly rise up on the toes of your injured leg  Hold for 5 seconds  Slowly lower your heel to the ground  · Toe curls:  Place a towel on the floor  Put your foot flat on the towel  Grab the towel with your toes by curling them around the towel  Lift the towel up with your toes  · Toe taps:  Sit down and place your foot flat on the floor  Keep your heel on the floor  Point all your toes up toward the ceiling  While the 4 smaller toes are pointed up, bend your big toe down and tap it on the ground  Do 10 to 50 taps  Point all 5 toes up toward the ceiling again  This time keep your big toe pointed up and tap the 4 smaller toes on the ground  Do 10 to 50 taps each time  Follow up with your doctor as directed:  Write down your questions so you remember to ask them during your visits  © Copyright [employer] 2021 Information is for End User's use only and may not be sold, redistributed or otherwise used for commercial purposes  All illustrations and images included in CareNotes® are the copyrighted property of [employer]  or [facility] Kitty Zheng   The above information is an  only  It is not intended as medical advice for individual conditions or treatments  Talk to your doctor, nurse or pharmacist before following any medical regimen to see if it is safe and effective for you
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
## 2022-02-04 NOTE — LETTER
February 4, 2022     Ezequiel Mead, [address] [address]    Patient: Satinder Gilbert   YOB: 1980   Date of Visit: 2/4/2022       Dear Dr Jam Jimenez: Thank you for referring Satinder Gilbert to me for evaluation  Below are my notes for this consultation  If you have questions, please do not hesitate to call me  I look forward to following your patient along with you  Sincerely,        Alyssa Myers DPM        CC: No Recipients  Jame Bhagat  2/4/2022 11:26 AM  Signed  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  I did round off the corner of the nail  He should do a daily foot soaks with Epsom salt to soften the nail bed and reduce callus formation  We did discussed matrixectomy but this is rather aggressive for this condition  If it does continue to be an issue or cause infection it can be considered  He has some nonspecific burning achiness along the medial arch of his left foot  I suspect this may just be mild plantar fasciitis  I did provide him some home therapy foot and ankle exercises  Reappoint as needed    Subjective:      Patient ID: Satinder Gilbert is a 37 y o  male  Patient presents with chronic foot pain  He has chronic pain to the medial part of his right great toenail  He has not hadd drainage but it is tender  On the left he has a burning stinging sensation in the arch  He gets some numbness when he lays down in the bed  IT feels like stinging and/or burning  IT seems to come and go without any relation to his activity  He does smoke 1/2 pdd         The following portions of the patient's history were reviewed and updated as appropriate:   He  has a past medical history of Allergic, Anxiety, Depression, and Hyperlipemia  He   Patient Active Problem List    Diagnosis Date Noted    Vitamin D deficiency 01/11/2022     He  has a past surgical history that includes Abdominal surgery; Bowel resection; and Head & neck wound repair / closure  His family history includes Schizophrenia in his mother  He  reports that he has been smoking cigarettes  He has been smoking about 0 50 packs per day  He has never used smokeless tobacco  He reports current alcohol use  He reports current drug use  Drug: Marijuana  Current Outpatient Medications   Medication Sig Dispense Refill    amphetamine-dextroamphetamine (ADDERALL, 10MG,) 10 mg tablet Take 1 tablet (10 mg total) by mouth daily Max Daily Amount: 10 mg 30 tablet 0    ergocalciferol (VITAMIN D2) 50,000 units Take 1 capsule (50,000 Units total) by mouth once a week 4 capsule 5     No current facility-administered medications for this visit  Current Outpatient Medications on File Prior to Visit   Medication Sig    amphetamine-dextroamphetamine (ADDERALL, 10MG,) 10 mg tablet Take 1 tablet (10 mg total) by mouth daily Max Daily Amount: 10 mg    ergocalciferol (VITAMIN D2) 50,000 units Take 1 capsule (50,000 Units total) by mouth once a week     No current facility-administered medications on file prior to visit  He has No Known Allergies       Review of Systems   Constitutional: Negative  HENT: Negative  Respiratory: Negative  Cardiovascular: Negative  Gastrointestinal: Negative  Musculoskeletal: Positive for arthralgias  Skin: Positive for color change (ingrown toenail right big toe)  Neurological: Positive for numbness ( burning left arch)  Psychiatric/Behavioral: The patient is not nervous/anxious  Objective:      /84   Pulse 62   Ht 6' (1 829 m) Comment: verbal  Wt 90 7 kg (200 lb)   BMI 27 12 kg/m²          Physical Exam    Vitals reviewed    Constitutional: Patient is not distressed  Patient is well developed     Patient is healthy weight  Vascular: Dorsalis pedis and posterior tibial pulses palpable  Capillary refill time within normal limits to all digits  No erythema  No edema  No significant varicosities  Dermatology: No rash  No open lesions  Present pedal hair  Skin has healthy turgor  pincer nail deformity to the medial corner of the left great toenail  There is some callus and thickening of the skin on the medial nail bed but no cellulitis or purulence    Musculoskeletal: Normal range of motion to ankle, subtalar joint, and midtarsal joint  Normal range of motion first MTPJ  Manual muscle testing 5 out of 5 for inversion/eversion/dorsiflexion/plantarflexion  On stance patients feet are generally rectus  nonspecific tingling burning and aching sensation along the medial band of the plantar fascia and medial arch of the foot  No posterior tibial tendon pain or weakness  No navicular pain  No midfoot instability  No FHL pain with stressed  Neurological: Monofilament sensation is intact  Vibratory sensation is intact  Achilles reflex is normal    Proprioception is normal    Respiratory: Normal respiratory effort, no distress    Psych: Patient is AAOx3  Normal mood       Lymphatic: nonpalpable popliteal lymph nodes  Nonpalpable groin lymph nodes

## 2022-02-08 ENCOUNTER — OFFICE VISIT (OUTPATIENT)
Dept: FAMILY MEDICINE CLINIC | Facility: CLINIC | Age: 42
End: 2022-02-08
Payer: COMMERCIAL

## 2022-02-08 VITALS
WEIGHT: 210 LBS | BODY MASS INDEX: 27.83 KG/M2 | SYSTOLIC BLOOD PRESSURE: 112 MMHG | OXYGEN SATURATION: 99 % | HEART RATE: 69 BPM | TEMPERATURE: 96.9 F | DIASTOLIC BLOOD PRESSURE: 82 MMHG | HEIGHT: 73 IN

## 2022-02-08 DIAGNOSIS — E55.9 VITAMIN D DEFICIENCY: ICD-10-CM

## 2022-02-08 DIAGNOSIS — F98.8 ATTENTION DEFICIT DISORDER (ADD) IN ADULT: ICD-10-CM

## 2022-02-08 DIAGNOSIS — F98.8 ATTENTION DEFICIT DISORDER (ADD) WITHOUT HYPERACTIVITY: Primary | ICD-10-CM

## 2022-02-08 PROCEDURE — 99214 OFFICE O/P EST MOD 30 MIN: CPT | Performed by: FAMILY MEDICINE

## 2022-02-08 RX ORDER — DEXTROAMPHETAMINE SACCHARATE, AMPHETAMINE ASPARTATE MONOHYDRATE, DEXTROAMPHETAMINE SULFATE AND AMPHETAMINE SULFATE 5; 5; 5; 5 MG/1; MG/1; MG/1; MG/1
20 CAPSULE, EXTENDED RELEASE ORAL EVERY MORNING
Qty: 30 CAPSULE | Refills: 0 | Status: SHIPPED | OUTPATIENT
Start: 2022-02-08 | End: 2022-06-22 | Stop reason: SDUPTHER

## 2022-02-08 RX ORDER — ERGOCALCIFEROL 1.25 MG/1
50000 CAPSULE ORAL WEEKLY
Qty: 4 CAPSULE | Refills: 5 | Status: SHIPPED | OUTPATIENT
Start: 2022-02-08 | End: 2022-06-22

## 2022-02-08 NOTE — PROGRESS NOTES
Assessment/Plan:    I did adjust the Adderall dose  Will continue take the vitamin-D  He will have lab work done at his convenience  Follow-up in a month  Diagnoses and all orders for this visit:    Attention deficit disorder (ADD) without hyperactivity  -     amphetamine-dextroamphetamine (ADDERALL XR, 20MG,) 20 MG 24 hr capsule; Take 1 capsule (20 mg total) by mouth every morning Max Daily Amount: 20 mg    Vitamin D deficiency  -     ergocalciferol (VITAMIN D2) 50,000 units; Take 1 capsule (50,000 Units total) by mouth once a week    Attention deficit disorder (ADD) in adult            Subjective:        Patient ID: Juan Carvajal is a 37 y o  male  He presents to the office today for follow-up  He is feeling well  He says the Adderall does not really seem to do as much as he expected  He is not taking it every day but when he does take it it is not even keep him up  It is helping him somewhat with concentration  He continues take the vitamin-D  He has not had lab work done yet  The following portions of the patient's history were reviewed and updated as appropriate: allergies, current medications, past family history, past medical history, past social history, past surgical history and problem list       Review of Systems   Constitutional: Negative  HENT: Negative  Eyes: Negative  Respiratory: Negative  Cardiovascular: Negative  Gastrointestinal: Negative  Endocrine: Negative  Genitourinary: Negative  Musculoskeletal: Negative  Skin: Negative  Allergic/Immunologic: Negative  Neurological: Negative  Hematological: Negative  Psychiatric/Behavioral: Negative  All other systems reviewed and are negative  Objective:      BMI Counseling: Body mass index is 27 71 kg/m²   The BMI is above normal  Nutrition recommendations include decreasing portion sizes, encouraging healthy choices of fruits and vegetables, decreasing fast food intake, consuming healthier snacks, limiting drinks that contain sugar, moderation in carbohydrate intake, increasing intake of lean protein, reducing intake of saturated and trans fat and reducing intake of cholesterol  Exercise recommendations include moderate physical activity 150 minutes/week  No pharmacotherapy was ordered  Rationale for BMI follow-up plan is due to patient being overweight or obese  /82 (BP Location: Left arm, Patient Position: Sitting, Cuff Size: Standard)   Pulse 69   Temp (!) 96 9 °F (36 1 °C) (Tympanic)   Ht 6' 1" (1 854 m)   Wt 95 3 kg (210 lb)   SpO2 99%   BMI 27 71 kg/m²          Physical Exam  Vitals and nursing note reviewed  Constitutional:       Appearance: He is well-developed  HENT:      Head: Normocephalic and atraumatic  Right Ear: External ear normal       Left Ear: External ear normal       Nose: Nose normal    Eyes:      Conjunctiva/sclera: Conjunctivae normal       Pupils: Pupils are equal, round, and reactive to light  Cardiovascular:      Rate and Rhythm: Normal rate and regular rhythm  Heart sounds: Normal heart sounds  Pulmonary:      Effort: Pulmonary effort is normal       Breath sounds: Normal breath sounds  Abdominal:      General: Bowel sounds are normal       Palpations: Abdomen is soft  Musculoskeletal:         General: Normal range of motion  Cervical back: Normal range of motion and neck supple  Skin:     General: Skin is warm and dry  Neurological:      Mental Status: He is alert and oriented to person, place, and time  Deep Tendon Reflexes: Reflexes are normal and symmetric     Psychiatric:         Behavior: Behavior normal

## 2022-03-04 ENCOUNTER — APPOINTMENT (OUTPATIENT)
Dept: LAB | Facility: HOSPITAL | Age: 42
End: 2022-03-04
Payer: COMMERCIAL

## 2022-03-04 LAB
25(OH)D3 SERPL-MCNC: 67.8 NG/ML (ref 30–100)
ALBUMIN SERPL BCP-MCNC: 4.3 G/DL (ref 3.5–5)
ALP SERPL-CCNC: 82 U/L (ref 46–116)
ALT SERPL W P-5'-P-CCNC: 35 U/L (ref 12–78)
ANION GAP SERPL CALCULATED.3IONS-SCNC: 10 MMOL/L (ref 4–13)
AST SERPL W P-5'-P-CCNC: 22 U/L (ref 5–45)
BILIRUB DIRECT SERPL-MCNC: 0.11 MG/DL (ref 0–0.2)
BILIRUB SERPL-MCNC: 0.41 MG/DL (ref 0.2–1)
BUN SERPL-MCNC: 16 MG/DL (ref 5–25)
CALCIUM SERPL-MCNC: 9.2 MG/DL (ref 8.3–10.1)
CHLORIDE SERPL-SCNC: 105 MMOL/L (ref 100–108)
CO2 SERPL-SCNC: 26 MMOL/L (ref 21–32)
CREAT SERPL-MCNC: 1.25 MG/DL (ref 0.6–1.3)
GFR SERPL CREATININE-BSD FRML MDRD: 71 ML/MIN/1.73SQ M
GLUCOSE SERPL-MCNC: 115 MG/DL (ref 65–140)
POTASSIUM SERPL-SCNC: 3.9 MMOL/L (ref 3.5–5.3)
PROT SERPL-MCNC: 7.5 G/DL (ref 6.4–8.2)
SODIUM SERPL-SCNC: 141 MMOL/L (ref 136–145)

## 2022-03-04 PROCEDURE — 80076 HEPATIC FUNCTION PANEL: CPT | Performed by: FAMILY MEDICINE

## 2022-03-04 PROCEDURE — 36415 COLL VENOUS BLD VENIPUNCTURE: CPT | Performed by: FAMILY MEDICINE

## 2022-03-04 PROCEDURE — 80048 BASIC METABOLIC PNL TOTAL CA: CPT | Performed by: FAMILY MEDICINE

## 2022-03-04 PROCEDURE — 82306 VITAMIN D 25 HYDROXY: CPT | Performed by: FAMILY MEDICINE

## 2022-03-08 ENCOUNTER — OFFICE VISIT (OUTPATIENT)
Dept: FAMILY MEDICINE CLINIC | Facility: CLINIC | Age: 42
End: 2022-03-08
Payer: COMMERCIAL

## 2022-03-08 VITALS
WEIGHT: 194 LBS | DIASTOLIC BLOOD PRESSURE: 82 MMHG | SYSTOLIC BLOOD PRESSURE: 130 MMHG | OXYGEN SATURATION: 99 % | TEMPERATURE: 97 F | BODY MASS INDEX: 25.71 KG/M2 | RESPIRATION RATE: 18 BRPM | HEIGHT: 73 IN | HEART RATE: 60 BPM

## 2022-03-08 DIAGNOSIS — F17.200 CURRENT SMOKER: ICD-10-CM

## 2022-03-08 DIAGNOSIS — R07.0 THROAT PAIN IN ADULT: Primary | ICD-10-CM

## 2022-03-08 DIAGNOSIS — F98.8 ATTENTION DEFICIT DISORDER (ADD) WITHOUT HYPERACTIVITY: ICD-10-CM

## 2022-03-08 PROCEDURE — 99214 OFFICE O/P EST MOD 30 MIN: CPT | Performed by: FAMILY MEDICINE

## 2022-03-08 RX ORDER — VARENICLINE TARTRATE
KIT
Qty: 53 EACH | Refills: 0 | Status: SHIPPED | OUTPATIENT
Start: 2022-03-08 | End: 2022-06-22

## 2022-03-08 RX ORDER — VARENICLINE TARTRATE 25 MG
KIT ORAL
Qty: 53 TABLET | Refills: 0 | Status: SHIPPED | OUTPATIENT
Start: 2022-03-08 | End: 2022-03-08

## 2022-03-08 RX ORDER — VARENICLINE TARTRATE 1 MG/1
1 TABLET, FILM COATED ORAL 2 TIMES DAILY
Qty: 60 TABLET | Refills: 3 | Status: SHIPPED | OUTPATIENT
Start: 2022-03-08 | End: 2022-06-22

## 2022-03-08 NOTE — PROGRESS NOTES
Assessment/Plan:   Is doing well  Continue current therapy  We talked about smoking cessation he is going to try to work on that  Will follow-up here as needed  Diagnoses and all orders for this visit:    Throat pain in adult    Current smoker  -     varenicline (CHANTIX JULIAN) 0 5 MG X 11 & 1 MG X 42 tablet; Take one 0 5 mg tablet by mouth once daily for 3 days, then one 0 5 mg tablet by mouth twice daily for 4 days, then one 1 mg tablet by mouth twice daily  -     varenicline (CHANTIX) 1 mg tablet; Take 1 tablet (1 mg total) by mouth 2 (two) times a day    Attention deficit disorder (ADD) without hyperactivity          Subjective:     Patient ID: Juany Bartlett is a 37 y o  male  He is in today for follow-up  He is doing well so far as the Adderall is concerned  He has is not taking it every day doing well  He also complains of some throat pain  He has been a smoker for a long time and would like to quit smoking  Review of Systems   Constitutional: Negative  HENT: Positive for sore throat  Eyes: Negative  Respiratory: Negative  Cardiovascular: Negative  Gastrointestinal: Negative  Endocrine: Negative  Genitourinary: Negative  Musculoskeletal: Negative  Skin: Negative  Allergic/Immunologic: Negative  Neurological: Negative  Hematological: Negative  Psychiatric/Behavioral: Negative  All other systems reviewed and are negative  Objective:     Physical Exam  Vitals and nursing note reviewed  Constitutional:       Appearance: He is well-developed  HENT:      Head: Normocephalic and atraumatic  Right Ear: External ear normal       Left Ear: External ear normal       Nose: Nose normal    Eyes:      Conjunctiva/sclera: Conjunctivae normal       Pupils: Pupils are equal, round, and reactive to light  Cardiovascular:      Rate and Rhythm: Normal rate and regular rhythm  Heart sounds: Normal heart sounds     Pulmonary:      Effort: Pulmonary effort is normal       Breath sounds: Normal breath sounds  Abdominal:      General: Bowel sounds are normal       Palpations: Abdomen is soft  Musculoskeletal:         General: Normal range of motion  Cervical back: Normal range of motion and neck supple  Skin:     General: Skin is warm and dry  Neurological:      Mental Status: He is alert and oriented to person, place, and time  Deep Tendon Reflexes: Reflexes are normal and symmetric     Psychiatric:         Behavior: Behavior normal

## 2022-06-22 ENCOUNTER — OFFICE VISIT (OUTPATIENT)
Dept: FAMILY MEDICINE CLINIC | Facility: CLINIC | Age: 42
End: 2022-06-22
Payer: COMMERCIAL

## 2022-06-22 ENCOUNTER — TELEPHONE (OUTPATIENT)
Dept: PSYCHIATRY | Facility: CLINIC | Age: 42
End: 2022-06-22

## 2022-06-22 VITALS
DIASTOLIC BLOOD PRESSURE: 80 MMHG | SYSTOLIC BLOOD PRESSURE: 120 MMHG | HEIGHT: 73 IN | TEMPERATURE: 96.9 F | OXYGEN SATURATION: 99 % | HEART RATE: 62 BPM | BODY MASS INDEX: 24.65 KG/M2 | WEIGHT: 186 LBS

## 2022-06-22 DIAGNOSIS — G44.321 INTRACTABLE CHRONIC POST-TRAUMATIC HEADACHE: ICD-10-CM

## 2022-06-22 DIAGNOSIS — F98.8 ATTENTION DEFICIT DISORDER (ADD) WITHOUT HYPERACTIVITY: ICD-10-CM

## 2022-06-22 DIAGNOSIS — F33.1 MODERATE EPISODE OF RECURRENT MAJOR DEPRESSIVE DISORDER (HCC): Primary | ICD-10-CM

## 2022-06-22 DIAGNOSIS — F41.9 ANXIETY: ICD-10-CM

## 2022-06-22 PROCEDURE — 99214 OFFICE O/P EST MOD 30 MIN: CPT | Performed by: FAMILY MEDICINE

## 2022-06-22 RX ORDER — DEXTROAMPHETAMINE SACCHARATE, AMPHETAMINE ASPARTATE MONOHYDRATE, DEXTROAMPHETAMINE SULFATE AND AMPHETAMINE SULFATE 5; 5; 5; 5 MG/1; MG/1; MG/1; MG/1
20 CAPSULE, EXTENDED RELEASE ORAL EVERY MORNING
Qty: 30 CAPSULE | Refills: 0 | Status: SHIPPED | OUTPATIENT
Start: 2022-06-22 | End: 2022-06-27 | Stop reason: SDUPTHER
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
## 2022-06-27 DIAGNOSIS — F98.8 ATTENTION DEFICIT DISORDER (ADD) WITHOUT HYPERACTIVITY: ICD-10-CM

## 2022-06-27 RX ORDER — DEXTROAMPHETAMINE SACCHARATE, AMPHETAMINE ASPARTATE MONOHYDRATE, DEXTROAMPHETAMINE SULFATE AND AMPHETAMINE SULFATE 5; 5; 5; 5 MG/1; MG/1; MG/1; MG/1
20 CAPSULE, EXTENDED RELEASE ORAL EVERY MORNING
Qty: 30 CAPSULE | Refills: 0 | Status: SHIPPED | OUTPATIENT
Start: 2022-06-27

## 2022-08-19 ENCOUNTER — SOCIAL WORK (OUTPATIENT)
Dept: BEHAVIORAL/MENTAL HEALTH CLINIC | Facility: CLINIC | Age: 42
End: 2022-08-19
Payer: COMMERCIAL

## 2022-08-19 DIAGNOSIS — F40.10 SOCIAL ANXIETY DISORDER: ICD-10-CM

## 2022-08-19 DIAGNOSIS — F43.10 PTSD (POST-TRAUMATIC STRESS DISORDER): ICD-10-CM

## 2022-08-19 DIAGNOSIS — F41.0 PANIC ATTACKS: ICD-10-CM

## 2022-08-19 DIAGNOSIS — F90.0 ADHD, PREDOMINANTLY INATTENTIVE TYPE: Primary | ICD-10-CM

## 2022-08-19 DIAGNOSIS — F33.2 SEVERE EPISODE OF RECURRENT MAJOR DEPRESSIVE DISORDER, WITHOUT PSYCHOTIC FEATURES (HCC): ICD-10-CM

## 2022-08-19 PROCEDURE — 90791 PSYCH DIAGNOSTIC EVALUATION: CPT | Performed by: SOCIAL WORKER
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
## 2022-08-23 NOTE — BH TREATMENT PLAN
Nathalie Gaona                                              The signature pad is not working so the client and the undersigned verbally signed off on the plan  1980       Date of Initial Treatment Plan: 08/19/2022   Date of Current Treatment Plan: 08/19/2022    Treatment Plan Number initial    Strengths/Personal Resources for Self Care: I am loyal,caring, people gravitate towards me    Diagnosis:   1  ADHD, predominantly inattentive type     2  Severe episode of recurrent major depressive disorder, without psychotic features [facility])  Ambulatory referral to Psychiatry   3  Panic attacks     4  PTSD (post-traumatic stress disorder)     5  Social anxiety disorder         Area of Needs: Please see below      Long Term Goal 1: I need to manage my depression and my symptoms  Target Date: 02/12/2023  Completion Date: TBD         Short Term Objectives for Goal 1: Mindfulness, CBT and solution focused therapy strategies  Long Term Goal 2: I need to increase my motivation and my focus    Target Date: 02/12/2023  Completion Date: TBD    Short Term Objectives for Goal 2: I need to plan things and follow thru         Long Term Goal # 3: I need to manage my ADHD symptoms     Target Date: 02/12/2023  Completion Date: TBD    Short Term Objectives for Goal 3: I will discuss organizational skills and medication options  GOAL 1: Modality: Individual 2x per month   Completion Date tbd    GOAL 2: Modality: Individual 2x per month   Completion Date tbd     GOAL 3: Modality: Individual 2x per month   Completion Date tbd      Behavioral Health Treatment Plan [facility]: Diagnosis and Treatment Plan explained to Parker Langley relates understanding diagnosis and is agreeable to Treatment Plan  Client Comments : Please share your thoughts, feelings, need and/or experiences regarding your treatment plan: My therapist and I will work as a team to help me progress on my goals

## 2022-08-30 DIAGNOSIS — F98.8 ATTENTION DEFICIT DISORDER (ADD) WITHOUT HYPERACTIVITY: ICD-10-CM

## 2022-08-30 RX ORDER — DEXTROAMPHETAMINE SACCHARATE, AMPHETAMINE ASPARTATE MONOHYDRATE, DEXTROAMPHETAMINE SULFATE AND AMPHETAMINE SULFATE 5; 5; 5; 5 MG/1; MG/1; MG/1; MG/1
20 CAPSULE, EXTENDED RELEASE ORAL EVERY MORNING
Qty: 30 CAPSULE | Refills: 0 | Status: SHIPPED | OUTPATIENT
Start: 2022-08-30 | End: 2022-10-17 | Stop reason: SDUPTHER

## 2022-09-02 ENCOUNTER — SOCIAL WORK (OUTPATIENT)
Dept: BEHAVIORAL/MENTAL HEALTH CLINIC | Facility: CLINIC | Age: 42
End: 2022-09-02
Payer: COMMERCIAL

## 2022-09-02 DIAGNOSIS — F41.0 PANIC ATTACKS: ICD-10-CM

## 2022-09-02 DIAGNOSIS — F90.0 ADHD, PREDOMINANTLY INATTENTIVE TYPE: ICD-10-CM

## 2022-09-02 DIAGNOSIS — F40.10 SOCIAL ANXIETY DISORDER: ICD-10-CM

## 2022-09-02 DIAGNOSIS — F33.2 SEVERE EPISODE OF RECURRENT MAJOR DEPRESSIVE DISORDER, WITHOUT PSYCHOTIC FEATURES (HCC): Primary | ICD-10-CM

## 2022-09-02 DIAGNOSIS — F43.10 PTSD (POST-TRAUMATIC STRESS DISORDER): ICD-10-CM

## 2022-09-02 PROCEDURE — 90834 PSYTX W PT 45 MINUTES: CPT | Performed by: SOCIAL WORKER

## 2022-09-02 NOTE — PSYCH
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  He was around a co worker who had covid and he admits he is now paranoid  He was not allowed to use one of their tests but he did  Now he feels he may lose his job  He complains about anxiety, social anxiety and a lack of self confidence  He mumbles he says now because he lost a tooth  Things are ok now with him and his wife  His depression is up and down  He is trying to improve his focus and motivation  Assessment: He denies suicidal/homicidal ideation, plan or intent  However he is dealing with depression, ADHD and poor motivation and focus  We continue to work on mindfulness, CBT and solution focused therapy strategies  Plan: Continue to help him skill build in distress tolerance  Behavioral Health Treatment Plan [facility]: Diagnosis and Treatment Plan explained to Dwight Alberts relates understanding diagnosis and is agreeable to Treatment Plan   Yes

## 2022-09-06 ENCOUNTER — TELEPHONE (OUTPATIENT)
Dept: PSYCHIATRY | Facility: CLINIC | Age: 42
End: 2022-09-06

## 2022-09-09 ENCOUNTER — TELEPHONE (OUTPATIENT)
Dept: PSYCHIATRY | Facility: CLINIC | Age: 42
End: 2022-09-09

## 2022-09-15 ENCOUNTER — SOCIAL WORK (OUTPATIENT)
Dept: BEHAVIORAL/MENTAL HEALTH CLINIC | Facility: CLINIC | Age: 42
End: 2022-09-15
Payer: COMMERCIAL

## 2022-09-15 DIAGNOSIS — F41.0 PANIC ATTACKS: ICD-10-CM

## 2022-09-15 DIAGNOSIS — F43.10 PTSD (POST-TRAUMATIC STRESS DISORDER): ICD-10-CM

## 2022-09-15 DIAGNOSIS — F33.2 SEVERE EPISODE OF RECURRENT MAJOR DEPRESSIVE DISORDER, WITHOUT PSYCHOTIC FEATURES (HCC): Primary | ICD-10-CM

## 2022-09-15 DIAGNOSIS — F90.0 ADHD, PREDOMINANTLY INATTENTIVE TYPE: ICD-10-CM

## 2022-09-15 DIAGNOSIS — F40.10 SOCIAL ANXIETY DISORDER: ICD-10-CM

## 2022-09-15 PROCEDURE — 90834 PSYTX W PT 45 MINUTES: CPT | Performed by: SOCIAL WORKER

## 2022-09-15 NOTE — PSYCH
Psychotherapy Provided: Individual Psychotherapy 45 minutes     Length of time in session: 45 minutes, follow up in 2 week    MDDR, Moderate, BRAYAN, PTSD, ADHD inattentive    Goals addressed in session: Goal 1, Goal 2 and Goal 3      Pain:      moderate to severe    0    Current suicide risk : Low     Data: Gwen arrived for his session  He lost his job due to an incident between him and a supervisor  He also has a lot of issues between him and his partner  He discussed the many issues between the two of them  He denies suicidal/homicidal ideation, plan or intent but he admits to depression  He is upset because she does not work but drives a range rover  He admits he struggles with depression, motivational issues,focus issues and he deals with his ADHD symptoms  Assessment: He denies suicidal/homicidal ideation, plan or intent  However he deals with depression, poor motivation and focus  We work on mindfulness, CBT and solution focused therapy strategies  Plan: Continue to help him skill build in distress tolerance  Behavioral Health Treatment Plan [facility]: Diagnosis and Treatment Plan explained to Yobany Albarran relates understanding diagnosis and is agreeable to Treatment Plan   Yes

## 2022-09-16 ENCOUNTER — TELEPHONE (OUTPATIENT)
Dept: PSYCHIATRY | Facility: CLINIC | Age: 42
End: 2022-09-16

## 2022-09-22 ENCOUNTER — OFFICE VISIT (OUTPATIENT)
Dept: FAMILY MEDICINE CLINIC | Facility: CLINIC | Age: 42
End: 2022-09-22
Payer: COMMERCIAL

## 2022-09-22 VITALS
HEART RATE: 74 BPM | BODY MASS INDEX: 25.31 KG/M2 | RESPIRATION RATE: 18 BRPM | DIASTOLIC BLOOD PRESSURE: 80 MMHG | WEIGHT: 191 LBS | TEMPERATURE: 97.9 F | HEIGHT: 73 IN | SYSTOLIC BLOOD PRESSURE: 122 MMHG

## 2022-09-22 DIAGNOSIS — E55.9 VITAMIN D DEFICIENCY: ICD-10-CM

## 2022-09-22 DIAGNOSIS — Z00.00 WELL ADULT EXAM: ICD-10-CM

## 2022-09-22 DIAGNOSIS — F98.8 ATTENTION DEFICIT DISORDER (ADD) WITHOUT HYPERACTIVITY: Primary | ICD-10-CM

## 2022-09-22 DIAGNOSIS — F33.2 SEVERE EPISODE OF RECURRENT MAJOR DEPRESSIVE DISORDER, WITHOUT PSYCHOTIC FEATURES (HCC): ICD-10-CM

## 2022-09-22 DIAGNOSIS — F41.9 ANXIETY: ICD-10-CM

## 2022-09-22 PROCEDURE — 99214 OFFICE O/P EST MOD 30 MIN: CPT | Performed by: FAMILY MEDICINE

## 2022-09-22 RX ORDER — ERGOCALCIFEROL 1.25 MG/1
50000 CAPSULE ORAL WEEKLY
Qty: 12 CAPSULE | Refills: 1 | Status: SHIPPED | OUTPATIENT
Start: 2022-09-22

## 2022-09-22 NOTE — PROGRESS NOTES
Assessment/Plan:  He is doing well  Continue current therapy  He is still using the Adderall but uses it only occasionally  He says it does help  He is following up with Psychiatry  See him back here in 3-6 months when he needs a refill  Diagnoses and all orders for this visit:    Attention deficit disorder (ADD) without hyperactivity  -     TSH, 3rd generation with Free T4 reflex    Anxiety    Severe episode of recurrent major depressive disorder, without psychotic features ([facility] [address] )    Vitamin D deficiency  -     ergocalciferol (VITAMIN D2) 50,000 units; Take 1 capsule (50,000 Units total) by mouth once a week  -     Vitamin D 25 hydroxy    Well adult exam  -     Lipid panel  -     Comprehensive metabolic panel  -     CBC and differential  -     TSH, 3rd generation with Free T4 reflex  -     Vitamin D 25 hydroxy          Subjective:        Patient ID: Harpreet Penaloza is a 40 y o  male  Patient presents with: Follow-up: 3 month follow up            The following portions of the patient's history were reviewed and updated as appropriate: allergies, current medications, past family history, past medical history, past social history, past surgical history and problem list       Review of Systems   Constitutional: Negative  HENT: Negative  Eyes: Negative  Respiratory: Negative  Cardiovascular: Negative  Gastrointestinal: Negative  Endocrine: Negative  Genitourinary: Negative  Musculoskeletal: Negative  Skin: Negative  Allergic/Immunologic: Negative  Neurological: Negative  Hematological: Negative  Psychiatric/Behavioral: Positive for decreased concentration and dysphoric mood  All other systems reviewed and are negative  Objective: Tobacco Cessation Counseling: Tobacco cessation counseling was provided   The patient is sincerely urged to quit consumption of tobacco  He is not ready to quit tobacco            /80 (BP Location: Left arm, Patient Position: Sitting, Cuff Size: Adult)   Pulse 74   Temp 97 9 °F (36 6 °C) (Tympanic)   Resp 18   Ht 6' 1" (1 854 m)   Wt 86 6 kg (191 lb)   BMI 25 20 kg/m²          Physical Exam  Vitals and nursing note reviewed  Constitutional:       Appearance: He is well-developed  HENT:      Head: Normocephalic and atraumatic  Eyes:      Pupils: Pupils are equal, round, and reactive to light  Cardiovascular:      Rate and Rhythm: Normal rate  Pulmonary:      Effort: Pulmonary effort is normal       Breath sounds: No wheezing  Abdominal:      Palpations: Abdomen is soft  Musculoskeletal:      Cervical back: Normal range of motion and neck supple  Lymphadenopathy:      Cervical: No cervical adenopathy  Skin:     General: Skin is warm  Neurological:      General: No focal deficit present  Mental Status: He is alert and oriented to person, place, and time     Psychiatric:         Mood and Affect: Mood normal

## 2022-09-27 ENCOUNTER — OFFICE VISIT (OUTPATIENT)
Dept: PSYCHIATRY | Facility: CLINIC | Age: 42
End: 2022-09-27
Payer: COMMERCIAL

## 2022-09-27 ENCOUNTER — TELEPHONE (OUTPATIENT)
Dept: PSYCHIATRY | Facility: CLINIC | Age: 42
End: 2022-09-27

## 2022-09-27 VITALS — HEIGHT: 73 IN | WEIGHT: 188.7 LBS | BODY MASS INDEX: 25.01 KG/M2

## 2022-09-27 DIAGNOSIS — F43.10 PANIC ATTACK DUE TO POST TRAUMATIC STRESS DISORDER (PTSD): ICD-10-CM

## 2022-09-27 DIAGNOSIS — F41.0 PANIC ATTACK DUE TO POST TRAUMATIC STRESS DISORDER (PTSD): ICD-10-CM

## 2022-09-27 DIAGNOSIS — F33.1 MODERATE EPISODE OF RECURRENT MAJOR DEPRESSIVE DISORDER (HCC): ICD-10-CM

## 2022-09-27 DIAGNOSIS — F43.10 PTSD (POST-TRAUMATIC STRESS DISORDER): Primary | ICD-10-CM

## 2022-09-27 DIAGNOSIS — F43.21 GRIEF: ICD-10-CM

## 2022-09-27 DIAGNOSIS — F98.8 ATTENTION DEFICIT DISORDER (ADD) WITHOUT HYPERACTIVITY: ICD-10-CM

## 2022-09-27 PROCEDURE — 90792 PSYCH DIAG EVAL W/MED SRVCS: CPT | Performed by: NURSE PRACTITIONER

## 2022-09-27 RX ORDER — PRAZOSIN HYDROCHLORIDE 1 MG/1
1 CAPSULE ORAL
Qty: 30 CAPSULE | Refills: 1 | Status: SHIPPED | OUTPATIENT
Start: 2022-09-27

## 2022-09-27 NOTE — TELEPHONE ENCOUNTER
Writer cx follow -up with Ashia Breaux on 10/25/22 due to no need for a 4 week follow-up, Pt is scheduled to come back in 6 weeks on 11/8/22   Thank you
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
## 2022-10-17 DIAGNOSIS — F98.8 ATTENTION DEFICIT DISORDER (ADD) WITHOUT HYPERACTIVITY: ICD-10-CM

## 2022-10-17 RX ORDER — DEXTROAMPHETAMINE SACCHARATE, AMPHETAMINE ASPARTATE MONOHYDRATE, DEXTROAMPHETAMINE SULFATE AND AMPHETAMINE SULFATE 5; 5; 5; 5 MG/1; MG/1; MG/1; MG/1
20 CAPSULE, EXTENDED RELEASE ORAL EVERY MORNING
Qty: 30 CAPSULE | Refills: 0 | Status: SHIPPED | OUTPATIENT
Start: 2022-10-17

## 2022-10-18 ENCOUNTER — TELEPHONE (OUTPATIENT)
Dept: PSYCHIATRY | Facility: CLINIC | Age: 42
End: 2022-10-18

## 2022-10-18 NOTE — TELEPHONE ENCOUNTER
Writer left a voice message to inform the appt on 10/28/22 with Jeanntete Delarosa will be cx due to provider is on vacation, follow-up 11/11/22

## 2022-11-03 ENCOUNTER — TELEPHONE (OUTPATIENT)
Dept: NEUROLOGY | Facility: CLINIC | Age: 42
End: 2022-11-03

## 2022-11-03 NOTE — TELEPHONE ENCOUNTER
Called and spoke to patient - confirmed upcoming appointment with Gabby Aly on 11/08/22 10:15 am at the [facility] office  Provided patient with apt date, time and location  Informed patient that check in is at least 15 minutes prior to apt time  The patient is not  having any issues or concerns at this time

## 2022-11-08 ENCOUNTER — CONSULT (OUTPATIENT)
Dept: NEUROLOGY | Facility: CLINIC | Age: 42
End: 2022-11-08

## 2022-11-08 VITALS
TEMPERATURE: 96.5 F | DIASTOLIC BLOOD PRESSURE: 73 MMHG | BODY MASS INDEX: 25.05 KG/M2 | HEIGHT: 73 IN | RESPIRATION RATE: 20 BRPM | WEIGHT: 189 LBS | HEART RATE: 53 BPM | SYSTOLIC BLOOD PRESSURE: 125 MMHG

## 2022-11-08 DIAGNOSIS — F32.A DEPRESSION: ICD-10-CM

## 2022-11-08 DIAGNOSIS — R20.2 PARESTHESIA: ICD-10-CM

## 2022-11-08 DIAGNOSIS — G44.321 INTRACTABLE CHRONIC POST-TRAUMATIC HEADACHE: Primary | ICD-10-CM

## 2022-11-08 DIAGNOSIS — Z87.828 HISTORY OF GUNSHOT WOUND: ICD-10-CM

## 2022-11-08 RX ORDER — CYCLOBENZAPRINE HCL 5 MG
5 TABLET ORAL
Qty: 15 TABLET | Refills: 2 | Status: SHIPPED | OUTPATIENT
Start: 2022-11-08
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
## 2022-11-08 NOTE — PATIENT INSTRUCTIONS
Magnesium oxide 400mg daily over the counter  Make sure to stay well hydrated and try to keep a regular sleep schedule  Repeat CT head for worsening in headache  Suggest continued follow up with psychiatry  Flexeril as needed at night for sleep/headache  Follow up in 4 months or sooner if needed

## 2022-11-10 ENCOUNTER — TELEPHONE (OUTPATIENT)
Dept: PSYCHIATRY | Facility: CLINIC | Age: 42
End: 2022-11-10

## 2022-11-11 ENCOUNTER — SOCIAL WORK (OUTPATIENT)
Dept: BEHAVIORAL/MENTAL HEALTH CLINIC | Facility: CLINIC | Age: 42
End: 2022-11-11

## 2022-11-11 DIAGNOSIS — F43.10 PTSD (POST-TRAUMATIC STRESS DISORDER): ICD-10-CM

## 2022-11-11 DIAGNOSIS — F90.0 ADHD, PREDOMINANTLY INATTENTIVE TYPE: ICD-10-CM

## 2022-11-11 DIAGNOSIS — F33.2 SEVERE EPISODE OF RECURRENT MAJOR DEPRESSIVE DISORDER, WITHOUT PSYCHOTIC FEATURES (HCC): Primary | ICD-10-CM

## 2022-11-11 DIAGNOSIS — F41.0 PANIC ATTACKS: ICD-10-CM

## 2022-11-11 DIAGNOSIS — F40.10 SOCIAL ANXIETY DISORDER: ICD-10-CM

## 2022-11-11 NOTE — PSYCH
No Call  No Show  Charge    Staci Reich no showed 11/11/22 appointment , staff called and left message to reschedule appointment     Treatment Plan not due at this session

## 2022-11-14 ENCOUNTER — TELEPHONE (OUTPATIENT)
Dept: PSYCHIATRY | Facility: CLINIC | Age: 42
End: 2022-11-14

## 2022-11-14 NOTE — TELEPHONE ENCOUNTER
NO-SHOW LETTER MAILED TO Barbara Mena    ADDRESS: [address] [place] [address]  Demarcus Hner[facility]  [id number]

## 2022-11-14 NOTE — LETTER
22     Mike Munguia   : 1980   [address] [facility] [id number]       It is the policy of [facility] to monitor and manage appointments that have been no-showed or cancelled with less than 48-hour notice  This is necessary to ensure that we are able to provide timely access for all patients to our providers  Undue numbers of unutilized appointments delays necessary medical care for all patients  Dear Mike Munguia       We are sorry that you missed your appointment with Maxx Souza LCSW, PATRICIA ALMEIDA, JOSE on 2022  Brian Rice Your health and follow-up care are important to us  We want to make you aware of your next appointment with Maxx Souza LCSW, ACSW, BCD, [facility] that is scheduled for Friday,  2022 at 1 pm     Please be aware that our office policy states that if you 'no show' two Therapy appointments in a row without prior notice of cancellation, or three or more in a calendar year, you may be discharged from Therapy treatment  We want to bring this to your attention now to prevent an interruption of your care  If you have any questions about this policy, please call us at the number above  Thank you in advance for your cooperation and assistance         Sincerely,      [address] [facility] [address]

## 2022-11-15 ENCOUNTER — HOSPITAL ENCOUNTER (OUTPATIENT)
Dept: CT IMAGING | Facility: HOSPITAL | Age: 42
Discharge: HOME | End: 2022-11-15

## 2022-11-15 DIAGNOSIS — G44.321 INTRACTABLE CHRONIC POST-TRAUMATIC HEADACHE: ICD-10-CM

## 2022-11-25 ENCOUNTER — OFFICE VISIT (OUTPATIENT)
Dept: PSYCHIATRY | Facility: CLINIC | Age: 42
End: 2022-11-25

## 2022-11-25 DIAGNOSIS — Z91.199 NO-SHOW FOR APPOINTMENT: Primary | ICD-10-CM

## 2023-01-19 DIAGNOSIS — E55.9 VITAMIN D DEFICIENCY: ICD-10-CM

## 2023-01-19 DIAGNOSIS — F98.8 ATTENTION DEFICIT DISORDER (ADD) WITHOUT HYPERACTIVITY: ICD-10-CM

## 2023-01-20 ENCOUNTER — SOCIAL WORK (OUTPATIENT)
Dept: BEHAVIORAL/MENTAL HEALTH CLINIC | Facility: CLINIC | Age: 43
End: 2023-01-20

## 2023-01-20 DIAGNOSIS — F90.0 ADHD, PREDOMINANTLY INATTENTIVE TYPE: ICD-10-CM

## 2023-01-20 DIAGNOSIS — F43.10 PTSD (POST-TRAUMATIC STRESS DISORDER): ICD-10-CM

## 2023-01-20 DIAGNOSIS — F40.10 SOCIAL ANXIETY DISORDER: ICD-10-CM

## 2023-01-20 DIAGNOSIS — F41.0 PANIC ATTACKS: ICD-10-CM

## 2023-01-20 DIAGNOSIS — F33.2 SEVERE EPISODE OF RECURRENT MAJOR DEPRESSIVE DISORDER, WITHOUT PSYCHOTIC FEATURES (HCC): Primary | ICD-10-CM

## 2023-01-20 RX ORDER — ERGOCALCIFEROL 1.25 MG/1
50000 CAPSULE ORAL WEEKLY
Qty: 12 CAPSULE | Refills: 0 | Status: SHIPPED | OUTPATIENT
Start: 2023-01-20

## 2023-01-20 RX ORDER — DEXTROAMPHETAMINE SACCHARATE, AMPHETAMINE ASPARTATE MONOHYDRATE, DEXTROAMPHETAMINE SULFATE AND AMPHETAMINE SULFATE 5; 5; 5; 5 MG/1; MG/1; MG/1; MG/1
20 CAPSULE, EXTENDED RELEASE ORAL EVERY MORNING
Qty: 30 CAPSULE | Refills: 0 | Status: SHIPPED | OUTPATIENT
Start: 2023-01-20

## 2023-01-20 NOTE — PSYCH
Behavioral Health Psychotherapy Progress Note    Psychotherapy Provided: Individual Psychotherapy     MDDRS, social anxiety,panic attacks, ADHD,PTSD    Goals addressed in session: Goal 1, Goal 2 and Goal 3      DATA: My motivation is poor, I have problems at jobs,I have focus issues etc  I feel socially awkward  I deal with relationship issues  During this session, this clinician used the following therapeutic modalities: Cognitive Behavioral Therapy and Mindfulness-based Strategies He discussed the difficulties he has in his relationship  We tried to come up with solutions to help him deal more effectively with her  His partner is very self centered  He is not taking his meds consistently and says he takes them as needed  I explained not to do this that they wont work that way  He forgot his last appointment with [facility]  Assessment: Seth Hargrove denies suicidal/homicidal ideation, plan or intent  However he is inconsistent with taking his medications  He is having many symptoms and he has relationship issues  I provided psychoeducation on medication compliance and we worked on mindfulness and CBT  Plan: Continue to help him skill build in distress tolerance  Substance Abuse was not addressed during this session  If the client is diagnosed with a co-occurring substance use disorder, please indicate any changes in the frequency or amount of use: n/a  Stage of change for addressing substance use diagnoses: No substance use/Not applicable    ASSESSMENT:  Nancy Mistry presents with a Euthymic/ normal and Depressed mood  his affect is Normal range and intensity, which is not congruent, with his mood and the content of the session  The client has not made progress on their goals  He is dealing with depression, poor motivation and focus, and ADHD symptoms  Nancy Mistry presents with a none risk of suicide, none risk of self-harm, and none risk of harm to others      For any risk assessment that surpasses a "low" rating, a safety plan must be developed  A safety plan was indicated: no  If yes, describe in detail n/a    PLAN: Between sessions, Diann Restrepo will try to use mindfulness to manage his depression and social anxiety    At the next session, the therapist will use Cognitive Behavioral Therapy and Mindfulness-based Strategies to address his symptoms       Behavioral Health Treatment Plan and Discharge Planning: Diann Restrepo is aware of and agrees to continue to work on their treatment plan  They have identified and are working toward their discharge goals   yes    Visit start and stop times:    01/20/23   10:10   11:00       50 minute session

## 2023-01-20 NOTE — TELEPHONE ENCOUNTER
Last appt 9/22/22 Left message for patient to further discuss lab results.  Advised to call back clinic

## 2023-01-30 ENCOUNTER — TELEPHONE (OUTPATIENT)
Dept: PSYCHIATRY | Facility: CLINIC | Age: 43
End: 2023-01-30

## 2023-02-03 ENCOUNTER — TELEPHONE (OUTPATIENT)
Dept: PSYCHIATRY | Facility: CLINIC | Age: 43
End: 2023-02-03

## 2023-02-06 ENCOUNTER — TELEPHONE (OUTPATIENT)
Dept: PSYCHIATRY | Facility: CLINIC | Age: 43
End: 2023-02-06

## 2023-02-07 ENCOUNTER — OFFICE VISIT (OUTPATIENT)
Dept: PSYCHIATRY | Facility: CLINIC | Age: 43
End: 2023-02-07

## 2023-02-07 DIAGNOSIS — F98.8 ATTENTION DEFICIT DISORDER (ADD) WITHOUT HYPERACTIVITY: Primary | ICD-10-CM

## 2023-02-07 DIAGNOSIS — F33.1 MODERATE EPISODE OF RECURRENT MAJOR DEPRESSIVE DISORDER (HCC): ICD-10-CM

## 2023-02-07 DIAGNOSIS — F51.5 NIGHTMARES ASSOCIATED WITH CHRONIC POST-TRAUMATIC STRESS DISORDER: ICD-10-CM

## 2023-02-07 DIAGNOSIS — F43.12 NIGHTMARES ASSOCIATED WITH CHRONIC POST-TRAUMATIC STRESS DISORDER: ICD-10-CM

## 2023-02-07 RX ORDER — DEXTROAMPHETAMINE SACCHARATE, AMPHETAMINE ASPARTATE MONOHYDRATE, DEXTROAMPHETAMINE SULFATE AND AMPHETAMINE SULFATE 6.25; 6.25; 6.25; 6.25 MG/1; MG/1; MG/1; MG/1
25 CAPSULE, EXTENDED RELEASE ORAL EVERY MORNING
Qty: 30 CAPSULE | Refills: 0 | Status: SHIPPED | OUTPATIENT
Start: 2023-02-07

## 2023-02-07 RX ORDER — PRAZOSIN HYDROCHLORIDE 1 MG/1
1 CAPSULE ORAL
Qty: 30 CAPSULE | Refills: 2 | Status: SHIPPED | OUTPATIENT
Start: 2023-02-07
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
## 2023-02-08 ENCOUNTER — TELEPHONE (OUTPATIENT)
Dept: PSYCHIATRY | Facility: CLINIC | Age: 43
End: 2023-02-08

## 2023-02-14 ENCOUNTER — TELEPHONE (OUTPATIENT)
Dept: PSYCHIATRY | Facility: CLINIC | Age: 43
End: 2023-02-14

## 2023-02-16 ENCOUNTER — TELEPHONE (OUTPATIENT)
Dept: PSYCHIATRY | Facility: CLINIC | Age: 43
End: 2023-02-16

## 2023-02-20 ENCOUNTER — TELEPHONE (OUTPATIENT)
Dept: PSYCHIATRY | Facility: CLINIC | Age: 43
End: 2023-02-20

## 2023-02-20 ENCOUNTER — TELEPHONE (OUTPATIENT)
Dept: BEHAVIORAL/MENTAL HEALTH CLINIC | Facility: CLINIC | Age: 43
End: 2023-02-20

## 2023-02-20 NOTE — TELEPHONE ENCOUNTER
Called patient and lvm offering an opening tomorrow 2/21/23 @ 6 pm        Of patient calls back and want the appt please schedule

## 2023-02-20 NOTE — TELEPHONE ENCOUNTER
Writer contacted pt in regards to a vm we received  Pt stated that her medication is on back order at [facility] and requested script to be send over to another pharmacy  Writer explained to him that he has to find what pharmacy has the medication on stock and call us back to send script over

## 2023-02-21 ENCOUNTER — TELEPHONE (OUTPATIENT)
Dept: PSYCHIATRY | Facility: CLINIC | Age: 43
End: 2023-02-21

## 2023-02-21 ENCOUNTER — TELEPHONE (OUTPATIENT)
Dept: FAMILY MEDICINE CLINIC | Facility: CLINIC | Age: 43
End: 2023-02-21

## 2023-02-21 DIAGNOSIS — F98.8 ATTENTION DEFICIT DISORDER (ADD) WITHOUT HYPERACTIVITY: ICD-10-CM

## 2023-02-21 RX ORDER — DEXTROAMPHETAMINE SACCHARATE, AMPHETAMINE ASPARTATE MONOHYDRATE, DEXTROAMPHETAMINE SULFATE AND AMPHETAMINE SULFATE 6.25; 6.25; 6.25; 6.25 MG/1; MG/1; MG/1; MG/1
25 CAPSULE, EXTENDED RELEASE ORAL EVERY MORNING
Qty: 30 CAPSULE | Refills: 0 | Status: SHIPPED | OUTPATIENT
Start: 2023-02-21 | End: 2023-02-24 | Stop reason: SDUPTHER

## 2023-02-21 RX ORDER — DEXTROAMPHETAMINE SACCHARATE, AMPHETAMINE ASPARTATE MONOHYDRATE, DEXTROAMPHETAMINE SULFATE AND AMPHETAMINE SULFATE 6.25; 6.25; 6.25; 6.25 MG/1; MG/1; MG/1; MG/1
25 CAPSULE, EXTENDED RELEASE ORAL EVERY MORNING
Qty: 30 CAPSULE | Refills: 0 | Status: CANCELLED | OUTPATIENT
Start: 2023-02-21

## 2023-02-21 NOTE — TELEPHONE ENCOUNTER
Patient is calling to request a refill of 20mg Adderall XR  The patient states his psychiatrist increased the prescription to 25mg, but the patient states that dose is unavailable in this area, so he would like to go back to 20mg

## 2023-02-21 NOTE — TELEPHONE ENCOUNTER
Informed patient  Patient stated that contacted his psychiatrist who told him to find out where it was in stock and let them know where to send it  Patient stated he did not have time to find out where it was in stock  Informed patient that [address] stated it was in stock

## 2023-02-21 NOTE — TELEPHONE ENCOUNTER
Pt called in and is requesting his medication that was filled at his last appt on 2/7/2023  By Estefany Cheatham be sent to the [facility] on [address] in [facility] their ph# [phone]   Pt stated [facility] normal pharmacy [facility]'t have the medication

## 2023-02-23 NOTE — TELEPHONE ENCOUNTER
Initiated and completed the Amphet-dextroamphet 25 mg ER cap PA via navinet for [employer], marking it as "URGENT "    Left a VM for Gwen reviewing the PA request and process  Will call him back when a decision is reached  For your review

## 2023-02-23 NOTE — TELEPHONE ENCOUNTER
Patient called the office and stated that Adderall XR needs a PA  Patient asked to put a rush on it due to being out for one week and experiences side/effects concerns  Patient was unable to provide details due to being at work  Please review, thank you

## 2023-02-23 NOTE — TELEPHONE ENCOUNTER
Patient called and left message regarding missed call  Called and left message for patient informing process for PA was initiated and nurse will follow up with patient once response is received

## 2023-02-24 DIAGNOSIS — F98.8 ATTENTION DEFICIT DISORDER (ADD) WITHOUT HYPERACTIVITY: ICD-10-CM

## 2023-02-24 NOTE — TELEPHONE ENCOUNTER
Patient called and want to informed provider that his medication needs a pre auth  Medication name:    amphetamine-dextroamphetamine (ADDERALL XR, 25MG,) 25 MG 24 hr capsule    Please review

## 2023-02-24 NOTE — TELEPHONE ENCOUNTER
Called [facility] to appeal the Amphet-dextroamphet 25 mg ER cap PA santana and spoke to [facility] in the PA Department, who transferred me to [place], pharmacy reviewer  Completed a verbal appeal with [place] and received an instant approval from 1/24/23-1/24/24  Kirti is to fax us the Approval Letter  Approval letter received  Reviewed the PA approval with the [facility] pharmacist and she received a paid claim  Reviewed same with malcolm  For your review  Will scan to [employer]

## 2023-02-27 ENCOUNTER — TELEPHONE (OUTPATIENT)
Dept: PSYCHIATRY | Facility: CLINIC | Age: 43
End: 2023-02-27

## 2023-02-27 NOTE — TELEPHONE ENCOUNTER
Patient called and left voice message stating he lost his prescriptions and is "freaking out"  He is requesting a call back to discuss his medications, please review

## 2023-02-27 NOTE — TELEPHONE ENCOUNTER
Follow up call made and spoke with [facility]  He said he picked up his medication on Saturday and did take one  He went to the car wash after and thinks he threw the bag (with the medication inside) in the trash while cleaning his car  He didn't take one yesterday, since he wasn't working and only realized it today  He relates he's recently started taking the medication every day after seeing Greg Castillo and seeing the benefit of taking more consistently  He said he's in training on a new machine at work and is worried what will he do  Reviewed use of PDMP by pharmacy and providers with the potential of not refilling early and that insurance may not cover, although he can check with his insurance  Gwen verbalized understanding  Will follow up with [facility] after review by Preston Patino

## 2023-02-28 NOTE — TELEPHONE ENCOUNTER
Elizabetht a VM for Gwen and tootie requested for replacement prescription  Given nursing number to return the call

## 2023-02-28 NOTE — TELEPHONE ENCOUNTER
PDMP website reviewed  Danny Tobin has been appropriately adherent to controlled psychotropic medications without evidence of abuse or misuse in the past   I will send a one time refill of this medication to the pharmacy of his choice  Please have the patient verify pharmacy information, in addition to if they have the ability to refill the medication due to supply  Refill will be sent once information is provided  Thank you

## 2023-03-02 NOTE — TELEPHONE ENCOUNTER
Patient left a voice message stating that [facility] does not have medication in the system, did not name medication, and that he would call back later when he had time or the nurse could call him back today

## 2023-03-02 NOTE — TELEPHONE ENCOUNTER
Follow up call made and spoke with [facility]  He said he has trouble getting messages on his phone and had not gotten the message  Reviewed information  He will check with the pharmacy for availability and get back to nursing  Encouraged his to leave a specific message if not able to speak with staff at the tie of the call

## 2023-03-03 ENCOUNTER — TELEPHONE (OUTPATIENT)
Dept: NEUROLOGY | Facility: CLINIC | Age: 43
End: 2023-03-03

## 2023-03-03 RX ORDER — DEXTROAMPHETAMINE SACCHARATE, AMPHETAMINE ASPARTATE MONOHYDRATE, DEXTROAMPHETAMINE SULFATE AND AMPHETAMINE SULFATE 6.25; 6.25; 6.25; 6.25 MG/1; MG/1; MG/1; MG/1
25 CAPSULE, EXTENDED RELEASE ORAL EVERY MORNING
Qty: 30 CAPSULE | Refills: 0 | Status: SHIPPED | OUTPATIENT
Start: 2023-03-03 | End: 2023-04-12 | Stop reason: SDUPTHER

## 2023-03-03 NOTE — TELEPHONE ENCOUNTER
Medication sent to [facility] with note to pharmacy that this is a one time early refill with explanation  Unsure if insurance will cover this, so he may have to pay out of pocket  Please inform patient that script has been sent  Thank you

## 2023-03-03 NOTE — TELEPHONE ENCOUNTER
Patient left a voice message stating that the [facility] in [address] [place] [address] at [place] [address] has 20 of 25 Adderall  Please call for refill assistance    Attempted to call patient back

## 2023-03-03 NOTE — TELEPHONE ENCOUNTER
Follow up call made and spoke with [facility]  [facility] the [facility]  Please review

## 2023-03-03 NOTE — TELEPHONE ENCOUNTER
Writer attempted to contact pt in regards to a vm we received  Pt stated that pharmacy has the generic medication and request script be send over to [facility], [address] to establish contact to get more details about the medication  Lvm to call back

## 2023-03-03 NOTE — TELEPHONE ENCOUNTER
Called and left a voicemail for patient - Please call back to confirm upcoming appointment with [facility]  Provided patient with apt date, time and location  Informed patient that check in is at least 15 minutes prior to apt time  Also reminded patient of pending bloodwork

## 2023-03-06 ENCOUNTER — TELEPHONE (OUTPATIENT)
Dept: PSYCHIATRY | Facility: CLINIC | Age: 43
End: 2023-03-06

## 2023-03-06 NOTE — TELEPHONE ENCOUNTER
Gwen left a VM that the pharmacy need to be called regarding the prescription  Spoke with the pharmacist and reviewed this is a 1 time replacement  He agreed to fill the prescription  Requested to wait while the pharmacist checked for availability, but he was not able to check immediately  Spoke with Jumana jung and reviewed above  He will check with the pharmacy later and call nursing if needed

## 2023-03-06 NOTE — TELEPHONE ENCOUNTER
Gwen left a VM on the nurse line on Friday, 3/3/23, at 4 pm calling back to [employer]  Left a VN for Gwen notifying him that the requested medication was sent to the [address] [facility]   Nurse line number provided  For your review

## 2023-03-15 ENCOUNTER — TELEPHONE (OUTPATIENT)
Dept: PSYCHIATRY | Facility: CLINIC | Age: 43
End: 2023-03-15

## 2023-03-15 NOTE — TELEPHONE ENCOUNTER
Patient contacted the office to schedule a follow up visit with provider   Patient is now scheduled for 4/26/2023 at 11:30am

## 2023-03-21 ENCOUNTER — SOCIAL WORK (OUTPATIENT)
Dept: BEHAVIORAL/MENTAL HEALTH CLINIC | Facility: CLINIC | Age: 43
End: 2023-03-21

## 2023-03-21 DIAGNOSIS — F41.0 PANIC ATTACKS: ICD-10-CM

## 2023-03-21 DIAGNOSIS — F43.10 PTSD (POST-TRAUMATIC STRESS DISORDER): ICD-10-CM

## 2023-03-21 DIAGNOSIS — F90.0 ADHD, PREDOMINANTLY INATTENTIVE TYPE: ICD-10-CM

## 2023-03-21 DIAGNOSIS — F40.10 SOCIAL ANXIETY DISORDER: ICD-10-CM

## 2023-03-21 DIAGNOSIS — F33.2 SEVERE EPISODE OF RECURRENT MAJOR DEPRESSIVE DISORDER, WITHOUT PSYCHOTIC FEATURES (HCC): Primary | ICD-10-CM

## 2023-03-21 NOTE — BH CRISIS PLAN
Client Name: Adonay Jarquin       Client YOB: 1980  : 1980    Treatment Team (include name and contact information):     Psychotherapist: Dandre Olson LCSW    Psychiatrist: Brittaney Delgadillo NP   Release of information completed: n/a    " n/a   Release of information completed: no    Other (Specify Role): n/a    Release of information completed: no    Other (Specify Role): n/a   Release of information completed: no    Healthcare Provider  Alber MariaDO  [address] [address]  [phone]    Type of Plan   * Child plans (children 15 yo and younger) must be completed and signed by the child's legal guardian   * Plans for all individuals 15 yo and above must be signed by the client  Plan Type: adolescent/adult (15 and over) Initial      My Personal Strengths are (in the client's own words):  I give others the benefit of the doubt, I want to see others do good, I want to take care of others    The stressors and triggers that may put me at risk are:  other (describe) arguing over baker stuff especially with my girl, Not being able to provide the way I want to or not being where I should be career wise    Coping skills I can use to keep myself calm and safe: Other (describe) I sleep, smoke weed, old school music    Coping skills/supports I can use to maintain abstinence from substance use:   I smoke weed and drink socially    The people that provide me with help and support: (Include name, contact, and how they can help)   Support person #1: sister Jn Ha    * Phone number: [phone]    * How can they help me? Support and advice   Support person #2:Yousuf a friend    * Phone number: [phone]    * How can they help me?  Support and advice     Support person #3: n/a    * Phone number: n/a    * How can they help me? n/a    In the past, the following has helped me in times of crisis:    Other: sleep and smoke weed, I slow my mind down with TV      If it is an emergency and you need immediate help, call 9-1-1    If there is a possibility of danger to yourself or others, call the following crisis hotline resources:     Adult Crisis Numbers  Suicide Prevention Hotline - Dial 9-8-8  [facility]: Sera Finney 13: [address] [place] [address]: [address]: [phone]  [facility] ([facility]): [address]: [address] [place] [address] Avenue: [address] [place] [address] St: [phone] (daytime)  [phone] (after hours, weekends, holidays)     Child/Adolescent Crisis Numbers   [facility]: Javier Bustamante 10: [phone]   [facility]: [phone]   [facility] ([facility]): [phone]    Please note: Some [facility] do not have a separate number for Child/Adolescent specific crisis  If your county is not listed under Child/Adolescent, please call the adult number for your county     National Talk to Text Line   All Ages - [phone]    In the event your feelings become unmanageable, and you cannot reach your support system, you will call 911 immediately or go to the nearest hospital emergency room

## 2023-03-21 NOTE — PSYCH
Behavioral Health Psychotherapy Progress Note    Psychotherapy Provided: Individual Psychotherapy     1  Severe episode of recurrent major depressive disorder, without psychotic features ([address] )        2  Panic attacks        3  ADHD, predominantly inattentive type        4  Social anxiety disorder        5  PTSD (post-traumatic stress disorder)            Goals addressed in session: Goal 1, Goal 2 and Goal 3      DATA: Flaca Herman talked about all the baker things he and his partner argue about  However the biggest thing they argue about is finances, each others families and the fact she wont work but drives a [place]  Flaca Herman is financially stressed and frustrated  His partner is a RN and refuses to work  During this session, this clinician used the following therapeutic modalities: Client-centered Therapy, Cognitive Behavioral Therapy, Mindfulness-based Strategies and Supportive Psychotherapy    Substance Abuse was addressed during this session  If the client is diagnosed with a co-occurring substance use disorder, please indicate any changes in the frequency or amount of use: He smokes weed to relax  Stage of change for addressing substance use diagnoses: Pre-contemplation    ASSESSMENT:  Eunice Hager presents with a anxiousmood  his affect is anxious which is congruent, with his mood and the content of the session  The client has made progress on their goals  But he is with a partner that does not want to work Eunice Hager presents with a none risk of suicide, none risk of self-harm, and none risk of harm to others  For any risk assessment that surpasses a "low" rating, a safety plan must be developed  A safety plan was indicated: no  If yes, describe in detail n/a    PLAN: Between sessions, Eunice Hager will use mindfulness and CBT   At the next session, the therapist will use Client-centered Therapy, Cognitive Behavioral Therapy, Mindfulness-based Strategies and Supportive Psychotherapy to address issues and symptoms as they arise       Behavioral Health Treatment Plan and Discharge Planning: Stefani Titusmariajose is aware of and agrees to continue to work on their treatment plan  They have identified and are working toward their discharge goals   yes    Visit start and stop times:    03/21/23  Start Time: 1810  Stop Time: 1900  Total Visit Time: 50 minutes

## 2023-03-21 NOTE — BH TREATMENT PLAN
Outpatient Behavioral Health Psychotherapy Treatment Plan                           Treatment Plan Tracking: his update was due 2/12/2023  However he was last here 1/20/2023  Today 3/21/23 is the first time back since 1/20/2023 so we did it today  Roya Barbour  1980     Date of Initial Psychotherapy Assessment: 8/18/2022   Date of Current Treatment Plan: 03/21/23  Treatment Plan Target Date: 09/18/2023  Treatment Plan Expiration Date: 09/18/2023    Diagnosis:   1  Severe episode of recurrent major depressive disorder, without psychotic features ([facility] [address] )        2  Panic attacks        3  ADHD, predominantly inattentive type        4  Social anxiety disorder        5  PTSD (post-traumatic stress disorder)            Area(s) of Need: please see it below    Long Term Goal 1 (in the client's own words): I still need to more effectively manage my symptoms of depression    Stage of Change: Action    Target Date for completion: 09/18/2023     Anticipated therapeutic modalities: mindfulness/ distress tolerance/ CBT     People identified to complete this goal: Gwen with the help of his therapist        Objective 1: (identify the means of measuring success in meeting the objective): My symptoms will be maintained at a minimum  Objective 2: (identify the means of measuring success in meeting the objective): n/a      Long Term Goal 2 (in the client's own words): I continue to have issues with motivation and focus even though better than what it was  Stage of Change: Action    Target Date for completion: 09/18/2023     Anticipated therapeutic modalities: mindfulness, CBT and medication compliance  I keep reminding myself I need the money     People identified to complete this goal: Gwen with the help of his therapist      Objective 1: (identify the means of measuring success in meeting the objective): I will have good focus and no motivation problem  I am more present oriented         Objective 2: (identify the means of measuring success in meeting the objective): n/a     Long Term Goal 3 (in the client's own words): I still need to better manage my ADHD symptoms    Stage of Change: Action    Target Date for completion: 09/18/2023     Anticipated therapeutic modalities: mindfulness, CBT and medication compliance     People identified to complete this goal: Gwen with the help of his therapist        Objective 1: (identify the means of measuring success in meeting the objective): My ADHD symptoms are under control      Objective 2: (identify the means of measuring success in meeting the objective): n/a     I am currently under the care of a [facility] psychiatric provider: yes    My [facility] psychiatric provider is: Arleth Pham and Zora Jackson NP    I am currently taking psychiatric medications: Yes, as prescribed    I feel that I will be ready for discharge from mental health care when I reach the following (measurable goal/objective): When my symptoms of depression, and ADHD are at a minimum  For children and adults who have a legal guardian:   Has there been any change to custody orders and/or guardianship status? NA  If yes, attach updated documentation  I have created my Crisis Plan and have been offered a copy of this plan    [address]: Diagnosis and Treatment Plan explained to Fernandobhartivalerie Chang acknowledges an understanding of their diagnosis  Payam Landrum agrees to this treatment plan      I have been offered a copy of this Treatment Plan  yes

## 2023-03-31 ENCOUNTER — TELEPHONE (OUTPATIENT)
Dept: PSYCHIATRY | Facility: CLINIC | Age: 43
End: 2023-03-31

## 2023-03-31 NOTE — TELEPHONE ENCOUNTER
Writer contacted patient as a courtesy to inform him that he needs to call and update his insurance  Patient was thankful and will call

## 2023-04-04 ENCOUNTER — TELEPHONE (OUTPATIENT)
Dept: PSYCHIATRY | Facility: CLINIC | Age: 43
End: 2023-04-04

## 2023-04-04 NOTE — TELEPHONE ENCOUNTER
Patient is calling regarding cancelling an appointment      Date/Time: 4/4/2023 6pm    Reason: sick    Patient was rescheduled: YES [x] NO []  If yes, when was Patient reschedule for: 5/30/2023 6pm    Patient requesting call back to reschedule: YES [] NO [x]

## 2023-04-26 ENCOUNTER — TELEPHONE (OUTPATIENT)
Dept: PSYCHIATRY | Facility: CLINIC | Age: 43
End: 2023-04-26

## 2023-04-26 NOTE — TELEPHONE ENCOUNTER
Patient is calling regarding rescheduling an appointment      Date/Time: 4/26/23 at 11:30am   Reason: Patient is having Vehicle issues can not make it into the office      Patient was rescheduled: YES [x] NO []  If yes, when was Patient reschedule for: 5/2/23 at 11 am
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
## 2023-05-02 ENCOUNTER — OFFICE VISIT (OUTPATIENT)
Dept: PSYCHIATRY | Facility: CLINIC | Age: 43
End: 2023-05-02

## 2023-05-02 DIAGNOSIS — F90.0 ATTENTION DEFICIT HYPERACTIVITY DISORDER (ADHD), PREDOMINANTLY INATTENTIVE TYPE: Primary | ICD-10-CM

## 2023-05-02 DIAGNOSIS — F51.5 NIGHTMARES ASSOCIATED WITH CHRONIC POST-TRAUMATIC STRESS DISORDER: ICD-10-CM

## 2023-05-02 DIAGNOSIS — F43.12 NIGHTMARES ASSOCIATED WITH CHRONIC POST-TRAUMATIC STRESS DISORDER: ICD-10-CM

## 2023-05-02 DIAGNOSIS — F32.89 OTHER DEPRESSION: ICD-10-CM

## 2023-05-19 ENCOUNTER — TELEPHONE (OUTPATIENT)
Dept: PSYCHIATRY | Facility: CLINIC | Age: 43
End: 2023-05-19

## 2023-05-19 NOTE — TELEPHONE ENCOUNTER
Patient was calling with pharmacy info for his refill then stated they did not have the dosage he needs they had a different dosage, writer transferred caller to nursing line

## 2023-05-22 DIAGNOSIS — F98.8 ATTENTION DEFICIT DISORDER (ADD) WITHOUT HYPERACTIVITY: Primary | ICD-10-CM

## 2023-05-22 RX ORDER — DEXTROAMPHETAMINE SACCHARATE, AMPHETAMINE ASPARTATE MONOHYDRATE, DEXTROAMPHETAMINE SULFATE AND AMPHETAMINE SULFATE 5; 5; 5; 5 MG/1; MG/1; MG/1; MG/1
20 CAPSULE, EXTENDED RELEASE ORAL EVERY MORNING
Qty: 30 CAPSULE | Refills: 0 | Status: SHIPPED | OUTPATIENT
Start: 2023-05-22

## 2023-05-22 NOTE — TELEPHONE ENCOUNTER
Gwen notified that Valentín Preston sent the requested prescription to the requested pharmacy  - [facility]

## 2023-05-22 NOTE — TELEPHONE ENCOUNTER
Gwen left a VM on the nurse line on Friday stating that he found a pharmacy which has the amphet-dextroamphet 20 mg ER caps available, and asks that Parviz Mckenzie, john send a prescription there:  [facility]  [address] [facility], on his preferred pharmacy list       Annabelle Ball, please advise

## 2023-05-31 ENCOUNTER — TELEPHONE (OUTPATIENT)
Dept: PSYCHIATRY | Facility: CLINIC | Age: 43
End: 2023-05-31

## 2023-05-31 NOTE — TELEPHONE ENCOUNTER
DISCHARGE LETTER for José Miguel Billings, ORIANA, ACSW, BCJULIO CESAR, [facility] (certified and regular) placed in outgoing mail on 05/31/23      Article #:  [id number]    Address:  [address] [address]

## 2023-06-06 NOTE — TELEPHONE ENCOUNTER
Certificate for the Discharge Letter was signed/received on 6/06/2022  A copy has been scanned into Media

## 2023-06-20 ENCOUNTER — SOCIAL WORK (OUTPATIENT)
Dept: BEHAVIORAL/MENTAL HEALTH CLINIC | Facility: CLINIC | Age: 43
End: 2023-06-20
Payer: COMMERCIAL

## 2023-06-20 DIAGNOSIS — F43.10 PTSD (POST-TRAUMATIC STRESS DISORDER): ICD-10-CM

## 2023-06-20 DIAGNOSIS — F41.0 PANIC ATTACKS: ICD-10-CM

## 2023-06-20 DIAGNOSIS — F90.0 ADHD, PREDOMINANTLY INATTENTIVE TYPE: ICD-10-CM

## 2023-06-20 DIAGNOSIS — F33.2 SEVERE EPISODE OF RECURRENT MAJOR DEPRESSIVE DISORDER, WITHOUT PSYCHOTIC FEATURES (HCC): Primary | ICD-10-CM

## 2023-06-20 DIAGNOSIS — F40.10 SOCIAL ANXIETY DISORDER: ICD-10-CM

## 2023-06-20 PROCEDURE — 90834 PSYTX W PT 45 MINUTES: CPT | Performed by: SOCIAL WORKER

## 2023-06-20 NOTE — PSYCH
"Behavioral Health Psychotherapy Progress Note    Psychotherapy Provided: Individual Psychotherapy     1  Severe episode of recurrent major depressive disorder, without psychotic features ([address] )        2  PTSD (post-traumatic stress disorder)        3  Social anxiety disorder        4  ADHD, predominantly inattentive type        5  Panic attacks            Goals addressed in session: Goal 1, Goal 2 and Goal 3      DATA: Tony Molina arrived for his session  He spoke about the fact his fiance is an RN and she refuses to work as an RN  This is frustrating to him  This is frustating to him  This affects his depression  It also affects his motivation and his focus  He is deciding on whether to stay in the relationship  We discussed on strategies to help him deal with his difficult relationship  During this session, this clinician used the following therapeutic modalities: Client-centered Therapy, Cognitive Behavioral Therapy, Mindfulness-based Strategies and Supportive Psychotherapy    Substance Abuse was not addressed during this session  If the client is diagnosed with a co-occurring substance use disorder, please indicate any changes in the frequency or amount of use: n/a  Stage of change for addressing substance use diagnoses: No substance use/Not applicable    ASSESSMENT:  Jennifer Sandhu presents with a Anxious and Depressed mood  his affect is anxious and depressed, which is congruent, with his mood and the content of the session  The client has made progress on their goals  Jennifer Sandhu presents with a none risk of suicide, none risk of self-harm, and none risk of harm to others  For any risk assessment that surpasses a \"low\" rating, a safety plan must be developed  A safety plan was indicated: no  If yes, describe in detail n/a    PLAN: Between sessions, Jennifer Sandhu will use mindfulness and CBT   At the next session, the therapist will use Client-centered Therapy, Cognitive Behavioral Therapy, " Mindfulness-based Strategies and Supportive Psychotherapy to address issues and symptoms as they may arise       Behavioral Health Treatment Plan and Discharge Planning: Valentino Dubois is aware of and agrees to continue to work on their treatment plan  They have identified and are working toward their discharge goals   yes    Visit start and stop times:    06/20/23  Start Time: 1810  Stop Time: 1900  Total Visit Time: 50 minutes

## 2023-07-07 DIAGNOSIS — F98.8 ATTENTION DEFICIT DISORDER (ADD) WITHOUT HYPERACTIVITY: ICD-10-CM

## 2023-07-07 NOTE — TELEPHONE ENCOUNTER
Medication Refill Request     Name Adderall XR  Dose/Frequency 20 mg cap/ 1 cap by mouth every morning  Quantity 30 caps  Verified pharmacy   [x]  Verified ordering Provider   [x]  Does patient have enough for the next 3 days?  Yes [] No [x]

## 2023-07-10 RX ORDER — DEXTROAMPHETAMINE SACCHARATE, AMPHETAMINE ASPARTATE MONOHYDRATE, DEXTROAMPHETAMINE SULFATE AND AMPHETAMINE SULFATE 5; 5; 5; 5 MG/1; MG/1; MG/1; MG/1
20 CAPSULE, EXTENDED RELEASE ORAL EVERY MORNING
Qty: 30 CAPSULE | Refills: 0 | Status: SHIPPED | OUTPATIENT
Start: 2023-07-10

## 2023-07-10 NOTE — TELEPHONE ENCOUNTER
PDMP website reviewed. Tiffani Shah has been appropriately adherent to controlled psychotropic medications without evidence of abuse or misuse. As such, will send 30-day refill to pharmacy of choice and follow up as necessary.

## 2023-07-13 ENCOUNTER — TELEPHONE (OUTPATIENT)
Dept: PSYCHIATRY | Facility: CLINIC | Age: 43
End: 2023-07-13

## 2023-07-13 NOTE — TELEPHONE ENCOUNTER
Writer SULY to offer 8 am appointment for tomorrow, 7/14. Please schedule upon return call.  Thank you

## 2023-07-26 ENCOUNTER — APPOINTMENT (OUTPATIENT)
Dept: LAB | Facility: HOSPITAL | Age: 43
End: 2023-07-26
Payer: COMMERCIAL

## 2023-07-26 ENCOUNTER — OFFICE VISIT (OUTPATIENT)
Dept: FAMILY MEDICINE CLINIC | Facility: CLINIC | Age: 43
End: 2023-07-26
Payer: COMMERCIAL

## 2023-07-26 VITALS
TEMPERATURE: 98 F | OXYGEN SATURATION: 99 % | SYSTOLIC BLOOD PRESSURE: 120 MMHG | DIASTOLIC BLOOD PRESSURE: 80 MMHG | HEART RATE: 80 BPM | HEIGHT: 73 IN | WEIGHT: 180.4 LBS | BODY MASS INDEX: 23.91 KG/M2

## 2023-07-26 DIAGNOSIS — E55.9 VITAMIN D DEFICIENCY: ICD-10-CM

## 2023-07-26 DIAGNOSIS — F33.2 SEVERE EPISODE OF RECURRENT MAJOR DEPRESSIVE DISORDER, WITHOUT PSYCHOTIC FEATURES (HCC): ICD-10-CM

## 2023-07-26 DIAGNOSIS — Z12.12 SCREENING FOR COLORECTAL CANCER: ICD-10-CM

## 2023-07-26 DIAGNOSIS — Z12.11 SCREENING FOR COLORECTAL CANCER: ICD-10-CM

## 2023-07-26 DIAGNOSIS — L30.9 DERMATITIS: ICD-10-CM

## 2023-07-26 DIAGNOSIS — Z00.00 ANNUAL PHYSICAL EXAM: ICD-10-CM

## 2023-07-26 DIAGNOSIS — Z00.00 ANNUAL PHYSICAL EXAM: Primary | ICD-10-CM

## 2023-07-26 LAB
25(OH)D3 SERPL-MCNC: 37.7 NG/ML (ref 30–100)
ALBUMIN SERPL BCP-MCNC: 4.4 G/DL (ref 3.5–5)
ALP SERPL-CCNC: 66 U/L (ref 34–104)
ALT SERPL W P-5'-P-CCNC: 18 U/L (ref 7–52)
ANION GAP SERPL CALCULATED.3IONS-SCNC: 3 MMOL/L
AST SERPL W P-5'-P-CCNC: 18 U/L (ref 13–39)
BASOPHILS # BLD AUTO: 0.06 THOUSANDS/ÂΜL (ref 0–0.1)
BASOPHILS NFR BLD AUTO: 1 % (ref 0–1)
BILIRUB SERPL-MCNC: 0.36 MG/DL (ref 0.2–1)
BUN SERPL-MCNC: 14 MG/DL (ref 5–25)
CALCIUM SERPL-MCNC: 9.2 MG/DL (ref 8.4–10.2)
CHLORIDE SERPL-SCNC: 108 MMOL/L (ref 96–108)
CHOLEST SERPL-MCNC: 181 MG/DL
CO2 SERPL-SCNC: 27 MMOL/L (ref 21–32)
CREAT SERPL-MCNC: 1.06 MG/DL (ref 0.6–1.3)
EOSINOPHIL # BLD AUTO: 0.57 THOUSAND/ÂΜL (ref 0–0.61)
EOSINOPHIL NFR BLD AUTO: 9 % (ref 0–6)
ERYTHROCYTE [DISTWIDTH] IN BLOOD BY AUTOMATED COUNT: 12.8 % (ref 11.6–15.1)
GFR SERPL CREATININE-BSD FRML MDRD: 86 ML/MIN/1.73SQ M
GLUCOSE P FAST SERPL-MCNC: 84 MG/DL (ref 65–99)
HCT VFR BLD AUTO: 40.5 % (ref 36.5–49.3)
HDLC SERPL-MCNC: 36 MG/DL
HGB BLD-MCNC: 13 G/DL (ref 12–17)
IMM GRANULOCYTES # BLD AUTO: 0.01 THOUSAND/UL (ref 0–0.2)
IMM GRANULOCYTES NFR BLD AUTO: 0 % (ref 0–2)
LDLC SERPL CALC-MCNC: 122 MG/DL (ref 0–100)
LYMPHOCYTES # BLD AUTO: 2.48 THOUSANDS/ÂΜL (ref 0.6–4.47)
LYMPHOCYTES NFR BLD AUTO: 39 % (ref 14–44)
MCH RBC QN AUTO: 30.8 PG (ref 26.8–34.3)
MCHC RBC AUTO-ENTMCNC: 32.1 G/DL (ref 31.4–37.4)
MCV RBC AUTO: 96 FL (ref 82–98)
MONOCYTES # BLD AUTO: 0.41 THOUSAND/ÂΜL (ref 0.17–1.22)
MONOCYTES NFR BLD AUTO: 7 % (ref 4–12)
NEUTROPHILS # BLD AUTO: 2.78 THOUSANDS/ÂΜL (ref 1.85–7.62)
NEUTS SEG NFR BLD AUTO: 44 % (ref 43–75)
NONHDLC SERPL-MCNC: 145 MG/DL
NRBC BLD AUTO-RTO: 0 /100 WBCS
PLATELET # BLD AUTO: 212 THOUSANDS/UL (ref 149–390)
PMV BLD AUTO: 10 FL (ref 8.9–12.7)
POTASSIUM SERPL-SCNC: 4 MMOL/L (ref 3.5–5.3)
PROT SERPL-MCNC: 6.8 G/DL (ref 6.4–8.4)
RBC # BLD AUTO: 4.22 MILLION/UL (ref 3.88–5.62)
SODIUM SERPL-SCNC: 138 MMOL/L (ref 135–147)
TRIGL SERPL-MCNC: 114 MG/DL
TSH SERPL DL<=0.05 MIU/L-ACNC: 1.16 UIU/ML (ref 0.45–4.5)
WBC # BLD AUTO: 6.31 THOUSAND/UL (ref 4.31–10.16)

## 2023-07-26 PROCEDURE — 99396 PREV VISIT EST AGE 40-64: CPT | Performed by: FAMILY MEDICINE

## 2023-07-26 RX ORDER — ERGOCALCIFEROL 1.25 MG/1
50000 CAPSULE ORAL WEEKLY
Qty: 12 CAPSULE | Refills: 1 | Status: SHIPPED | OUTPATIENT
Start: 2023-07-26

## 2023-07-26 RX ORDER — BETAMETHASONE DIPROPIONATE 0.05 %
OINTMENT (GRAM) TOPICAL 2 TIMES DAILY
Qty: 30 G | Refills: 1 | Status: SHIPPED | OUTPATIENT
Start: 2023-07-26

## 2023-07-26 NOTE — PATIENT INSTRUCTIONS
Wellness Visit for Adults   AMBULATORY CARE:   A wellness visit  is when you see your healthcare provider to get screened for health problems. Your healthcare provider will also give you advice on how to stay healthy. Write down your questions so you remember to ask them. Ask your healthcare provider how often you should have a wellness visit. What happens at a wellness visit:  Your healthcare provider will ask about your health, and your family history of health problems. This includes high blood pressure, heart disease, and cancer. He or she will ask if you have symptoms that concern you, if you smoke, and about your mood. You may also be asked about your intake of medicines, supplements, food, and alcohol. Any of the following may be done:  • Your weight  will be checked. Your height may also be checked so your body mass index (BMI) can be calculated. Your BMI shows if you are at a healthy weight. • Your blood pressure  and heart rate will be checked. Your temperature may also be checked. • Blood and urine tests  may be done. Blood tests may be done to check your cholesterol levels. Abnormal cholesterol levels increase your risk for heart disease and stroke. You may also need a blood or urine test to check for diabetes if you are at increased risk. Urine tests may be done to look for signs of an infection or kidney disease. • A physical exam  includes checking your heartbeat and lungs with a stethoscope. Your healthcare provider may also check your skin to look for sun damage. • Screening tests  may be recommended. A screening test is done to check for diseases that may not cause symptoms. The screening tests you may need depend on your age, gender, family history, and lifestyle habits. For example, colorectal screening may be recommended if you are 48years old or older. Screening tests you need if you are a woman:   • A Pap smear  is used to screen for cervical cancer.  Pap smears are usually done every 3 to 5 years depending on your age. You may need them more often if you have had abnormal Pap smear test results in the past. Ask your healthcare provider how often you should have a Pap smear. • A mammogram  is an x-ray of your breasts to screen for breast cancer. Experts recommend mammograms every 2 years starting at age 48 years. You may need a mammogram at age 52 years or younger if you have an increased risk for breast cancer. Talk to your healthcare provider about when you should start having mammograms and how often you need them. Vaccines you may need:   • Get an influenza vaccine  every year. The influenza vaccine protects you from the flu. Several types of viruses cause the flu. The viruses change over time, so new vaccines are made each year. • Get a tetanus-diphtheria (Td) booster vaccine  every 10 years. This vaccine protects you against tetanus and diphtheria. Tetanus is a severe infection that may cause painful muscle spasms and lockjaw. Diphtheria is a severe bacterial infection that causes a thick covering in the back of your mouth and throat. • Get a human papillomavirus (HPV) vaccine  if you are female and aged 23 to 32 or male 23 to 24 and never received it. This vaccine protects you from HPV infection. HPV is the most common infection spread by sexual contact. HPV may also cause vaginal, penile, and anal cancers. • Get a pneumococcal vaccine  if you are aged 72 years or older. The pneumococcal vaccine is an injection given to protect you from pneumococcal disease. Pneumococcal disease is an infection caused by pneumococcal bacteria. The infection may cause pneumonia, meningitis, or an ear infection. • Get a shingles vaccine  if you are 60 or older, even if you have had shingles before. The shingles vaccine is an injection to protect you from the varicella-zoster virus. This is the same virus that causes chickenpox.  Shingles is a painful rash that develops in people who had chickenpox or have been exposed to the virus. How to eat healthy:  My Plate is a model for planning healthy meals. It shows the types and amounts of foods that should go on your plate. Fruits and vegetables make up about half of your plate, and grains and protein make up the other half. A serving of dairy is included on the side of your plate. The amount of calories and serving sizes you need depends on your age, gender, weight, and height. Examples of healthy foods are listed below:  • Eat a variety of vegetables  such as dark green, red, and orange vegetables. You can also include canned vegetables low in sodium (salt) and frozen vegetables without added butter or sauces. • Eat a variety of fresh fruits , canned fruit in 100% juice, frozen fruit, and dried fruit. • Include whole grains. At least half of the grains you eat should be whole grains. Examples include whole-wheat bread, wheat pasta, brown rice, and whole-grain cereals such as oatmeal.    • Eat a variety of protein foods such as seafood (fish and shellfish), lean meat, and poultry without skin (turkey and chicken). Examples of lean meats include pork leg, shoulder, or tenderloin, and beef round, sirloin, tenderloin, and extra lean ground beef. Other protein foods include eggs and egg substitutes, beans, peas, soy products, nuts, and seeds. • Choose low-fat dairy products such as skim or 1% milk or low-fat yogurt, cheese, and cottage cheese. • Limit unhealthy fats  such as butter, hard margarine, and shortening. Exercise:  Exercise at least 30 minutes per day on most days of the week. Some examples of exercise include walking, biking, dancing, and swimming. You can also fit in more physical activity by taking the stairs instead of the elevator or parking farther away from stores. Include muscle strengthening activities 2 days each week. Regular exercise provides many health benefits.  It helps you manage your weight, and decreases your risk for type 2 diabetes, heart disease, stroke, and high blood pressure. Exercise can also help improve your mood. Ask your healthcare provider about the best exercise plan for you. General health and safety guidelines:   • Do not smoke. Nicotine and other chemicals in cigarettes and cigars can cause lung damage. Ask your healthcare provider for information if you currently smoke and need help to quit. E-cigarettes or smokeless tobacco still contain nicotine. Talk to your healthcare provider before you use these products. • Limit alcohol. A drink of alcohol is 12 ounces of beer, 5 ounces of wine, or 1½ ounces of liquor. • Lose weight, if needed. Being overweight increases your risk of certain health conditions. These include heart disease, high blood pressure, type 2 diabetes, and certain types of cancer. • Protect your skin. Do not sunbathe or use tanning beds. Use sunscreen with a SPF 15 or higher. Apply sunscreen at least 15 minutes before you go outside. Reapply sunscreen every 2 hours. Wear protective clothing, hats, and sunglasses when you are outside. • Drive safely. Always wear your seatbelt. Make sure everyone in your car wears a seatbelt. A seatbelt can save your life if you are in an accident. Do not use your cell phone when you are driving. This could distract you and cause an accident. Pull over if you need to make a call or send a text message. • Practice safe sex. Use latex condoms if are sexually active and have more than one partner. Your healthcare provider may recommend screening tests for sexually transmitted infections (STIs). • Wear helmets, lifejackets, and protective gear. Always wear a helmet when you ride a bike or motorcycle, go skiing, or play sports that could cause a head injury. Wear protective equipment when you play sports. Wear a lifejacket when you are on a boat or doing water sports.     © Copyright [employer] 2022 Information is for End User's use only and may not be sold, redistributed or otherwise used for commercial purposes. The above information is an  only. It is not intended as medical advice for individual conditions or treatments. Talk to your doctor, nurse or pharmacist before following any medical regimen to see if it is safe and effective for you. Cigarette Smoking and Your Health   AMBULATORY CARE:   Risks to your health if you smoke:  Nicotine and other chemicals found in tobacco and e-cigarettes can damage every cell in your body. Even if you are a light smoker, you have an increased risk for cancer, heart disease, and lung disease. If you are pregnant or have diabetes, smoking increases your risk for complications. Nicotine can affect an adolescent's developing brain. This can lead to trouble thinking, learning, or paying attention. Benefits to your health if you stop smoking:   • You decrease respiratory symptoms such as coughing, wheezing, and shortness of breath. • You reduce your risk for cancers of the lung, mouth, throat, kidney, bladder, pancreas, stomach, and cervix. If you already have cancer, you increase the benefits of chemotherapy. You also reduce your risk for cancer returning or a second cancer from developing. • You reduce your risk for heart disease, blood clots, heart attack, and stroke. • You reduce your risk for lung infections, and diseases such as pneumonia, asthma, chronic bronchitis, and emphysema. • Your circulation improves. More oxygen can be delivered to your body. If you have diabetes, you lower your risk for complications, such as kidney, artery, and eye diseases. You also lower your risk for nerve damage. Nerve damage can lead to amputations, poor vision, and blindness. • You improve your body's ability to heal and to fight infections. • An adolescent can help his or her brain and body develop in a healthy way.  Talk to your adolescent about all the health risks of nicotine. If you can, start talking about nicotine when your child is younger than 12 years. This may make it easier for him or her not to start using nicotine as a teenager or adult. Explain to him or her that it is best never to start. It can be hard to try to quit later. Benefits to the health of others if you stop smoking:  Tobacco is harmful to nonsmokers who breathe in your secondhand smoke. The following are ways the health of others around you may improve when you stop smoking:  • You lower the risks for lung cancer and heart disease in nonsmoking adults. • If you are pregnant, you lower the risk for miscarriage, early delivery, low birth weight, and stillbirth. You also lower your baby's risk for SIDS, obesity, developmental delay, and neurobehavioral problems, such as ADHD. • If you have children, you lower their risk for ear infections, colds, pneumonia, bronchitis, and asthma. Follow up with your doctor as directed:  Write down your questions so you remember to ask them during your visits. For support and more information:   • [employer]  [address] [facility]  Phone: [address]  Phone: 7- 429 - [phone]  Web Address: [employer]    • [employer]  Phone: 4- 652 - 189-0224  Web Address: www.smokefree. gov  © Copyright Roseanne Berman 2022 Information is for End User's use only and may not be sold, redistributed or otherwise used for commercial purposes. The above information is an  only. It is not intended as medical advice for individual conditions or treatments. Talk to your doctor, nurse or pharmacist before following any medical regimen to see if it is safe and effective for you. Cholesterol and Your Health   AMBULATORY CARE:   Cholesterol  is a waxy, fat-like substance. Your body uses cholesterol to make hormones and new cells, and to protect nerves. Cholesterol is made by your body.  It also comes from certain foods you eat, such as meat and dairy products. Your healthcare provider can help you set goals for your cholesterol levels. He or she can help you create a plan to meet your goals. Cholesterol level goals: Your cholesterol level goals depend on your risk for heart disease, your age, and your other health conditions. The following are general guidelines:  • Total cholesterol  includes low-density lipoprotein (LDL), high-density lipoprotein (HDL), and triglyceride levels. The total cholesterol level should be lower than 200 mg/dL and is best at about 150 mg/dL. • LDL cholesterol  is called bad cholesterol  because it forms plaque in your arteries. As plaque builds up, your arteries become narrow, and less blood flows through. When plaque decreases blood flow to your heart, you may have chest pain. If plaque completely blocks an artery that brings blood to your heart, you may have a heart attack. Plaque can break off and form blood clots. Blood clots may block arteries in your brain and cause a stroke. The level should be less than 130 mg/dL and is best at about 100 mg/dL. • HDL cholesterol  is called good cholesterol  because it helps remove LDL cholesterol from your arteries. It does this by attaching to LDL cholesterol and carrying it to your liver. Your liver breaks down LDL cholesterol so your body can get rid of it. High levels of HDL cholesterol can help prevent a heart attack and stroke. Low levels of HDL cholesterol can increase your risk for heart disease, heart attack, and stroke. The level should be 60 mg/dL or higher. • Triglycerides  are a type of fat that store energy from foods you eat. High levels of triglycerides also cause plaque buildup. This can increase your risk for a heart attack or stroke. If your triglyceride level is high, your LDL cholesterol level may also be high. The level should be less than 150 mg/dL.     Any of the following can increase your risk for high cholesterol:   • Smoking cigarettes    • Being overweight or obese, or not getting enough exercise    • Drinking large amounts of alcohol    • A medical condition such as hypertension (high blood pressure) or diabetes    • Certain genes passed from your parents to you    • Age older than 65 years    What you need to know about having your cholesterol levels checked: Adults 21to 39years of age should have their cholesterol levels checked every 4 to 6 years. Adults 45 years or older should have their cholesterol checked every 1 to 2 years. You may need your cholesterol checked more often, or at a younger age, if you have risk factors for heart disease. You may also need to have your cholesterol checked more often if you have other health conditions, such as diabetes. Blood tests are used to check cholesterol levels. Blood tests measure your levels of triglycerides, LDL cholesterol, and HDL cholesterol. How healthy fats affect your cholesterol levels:  Healthy fats, also called unsaturated fats, help lower LDL cholesterol and triglyceride levels. Healthy fats include the following:  • Monounsaturated fats  are found in foods such as olive oil, canola oil, avocado, nuts, and olives. • Polyunsaturated fats,  such as omega 3 fats, are found in fish, such as salmon, trout, and tuna. They can also be found in plant foods such as flaxseed, walnuts, and soybeans. How unhealthy fats affect your cholesterol levels:  Unhealthy fats increase LDL cholesterol and triglyceride levels. They are found in foods high in cholesterol, saturated fat, and trans fat:  • Cholesterol  is found in eggs, dairy, and meat. • Saturated fat  is found in butter, cheese, ice cream, whole milk, and coconut oil. Saturated fat is also found in meat, such as sausage, hot dogs, and bologna. • Trans fat  is found in liquid oils and is used in fried and baked foods.  Foods that contain trans fats include chips, crackers, muffins, sweet rolls, microwave popcorn, and cookies. Treatment  for high cholesterol will also decrease your risk of heart disease, heart attack, and stroke. Treatment may include any of the following:  • Lifestyle changes  may include food, exercise, weight loss, and quitting smoking. You may also need to decrease the amount of alcohol you drink. Your healthcare provider will want you to start with lifestyle changes. Other treatment may be added if lifestyle changes are not enough. Your healthcare provider may recommend you work with a team to manage hyperlipidemia. The team may include medical experts such as a dietitian, an exercise or physical therapist, and a behavior therapist. Your family members may be included in helping you create lifestyle changes. • Medicines  may be given to lower your LDL cholesterol, triglyceride levels, or total cholesterol level. You may need medicines to lower your cholesterol if any of the following is true:    ? You have a history of stroke, TIA, unstable angina, or a heart attack. ? Your LDL cholesterol level is 190 mg/dL or higher. ? You are age 36 to 76 years, have diabetes or heart disease risk factors, and your LDL cholesterol is 70 mg/dL or higher. • Supplements  include fish oil, red yeast rice, and garlic. Fish oil may help lower your triglyceride and LDL cholesterol levels. It may also increase your HDL cholesterol level. Red yeast rice may help decrease your total cholesterol level and LDL cholesterol level. Garlic may help lower your total cholesterol level. Do not take any supplements without talking to your healthcare provider. Food changes you can make to lower your cholesterol levels:  A dietitian can help you create a healthy eating plan. He or she can show you how to read food labels and choose foods low in saturated fat, trans fats, and cholesterol. • Decrease the total amount of fat you eat.   Choose lean meats, fat-free or 1% fat milk, and low-fat dairy products, such as yogurt and cheese. Try to limit or avoid red meats. Limit or do not eat fried foods or baked goods, such as cookies. • Replace unhealthy fats with healthy fats. Cook foods in olive oil or canola oil. Choose soft margarines that are low in saturated fat and trans fat. Seeds, nuts, and avocados are other examples of healthy fats. • Eat foods with omega-3 fats. Examples include salmon, tuna, mackerel, walnuts, and flaxseed. Eat fish 2 times per week. Pregnant women should not eat fish that have high levels of mercury, such as shark, swordfish, and louisa mackerel. • Increase the amount of high-fiber foods you eat. High-fiber foods can help lower your LDL cholesterol. Aim to get between 20 and 30 grams of fiber each day. Fruits and vegetables are high in fiber. Eat at least 5 servings each day. Other high-fiber foods are whole-grain or whole-wheat breads, pastas, or cereals, and brown rice. Eat 3 ounces of whole-grain foods each day. Increase fiber slowly. You may have abdominal discomfort, bloating, and gas if you add fiber to your diet too quickly. • Eat healthy protein foods. Examples include low-fat dairy products, skinless chicken and turkey, fish, and nuts. • Limit foods and drinks that are high in sugar. Your dietitian or healthcare provider can help you create daily limits for high-sugar foods and drinks. The limit may be lower if you have diabetes or another health condition. Limits can also help you lose weight if needed. Lifestyle changes you can make to lower your cholesterol levels:   • Maintain a healthy weight. Ask your healthcare provider what a healthy weight is for you. Ask him or her to help you create a weight loss plan if needed. Weight loss can decrease your total cholesterol and triglyceride levels. Weight loss may also help keep your blood pressure at a healthy level. • Be physically active throughout the day.   Physical activity, such as exercise, can help lower your total cholesterol level and maintain a healthy weight. Physical activity can also help increase your HDL cholesterol level. Work with your healthcare provider to create an program that is right for you. Get at least 30 to 40 minutes of moderate physical activity most days of the week. Examples of exercise include brisk walking, swimming, or biking. Also include strength training at least 2 times each week. Your healthcare providers can help you create a physical activity plan. • Do not smoke. Nicotine and other chemicals in cigarettes and cigars can raise your cholesterol levels. Ask your healthcare provider for information if you currently smoke and need help to quit. E-cigarettes or smokeless tobacco still contain nicotine. Talk to your healthcare provider before you use these products. • Limit or do not drink alcohol. Alcohol can increase your triglyceride levels. Ask your healthcare provider before you drink alcohol. Ask how much is okay for you to drink in 24 hours or 1 week. Follow up with your doctor as directed:  Write down your questions so you remember to ask them during your visits. © Copyright Shirley Armstrong 2022 Information is for End User's use only and may not be sold, redistributed or otherwise used for commercial purposes. The above information is an  only. It is not intended as medical advice for individual conditions or treatments. Talk to your doctor, nurse or pharmacist before following any medical regimen to see if it is safe and effective for you.
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
## 2023-07-28 ENCOUNTER — TELEPHONE (OUTPATIENT)
Dept: PSYCHIATRY | Facility: CLINIC | Age: 43
End: 2023-07-28

## 2023-07-28 NOTE — TELEPHONE ENCOUNTER
Writer spoke with patient to reschedule appointment due to provider schedule change.  Patient is now schedule on 8/10 @2pm.

## 2023-08-02 ENCOUNTER — SOCIAL WORK (OUTPATIENT)
Dept: BEHAVIORAL/MENTAL HEALTH CLINIC | Facility: CLINIC | Age: 43
End: 2023-08-02

## 2023-08-02 DIAGNOSIS — F33.2 SEVERE EPISODE OF RECURRENT MAJOR DEPRESSIVE DISORDER, WITHOUT PSYCHOTIC FEATURES (HCC): Primary | ICD-10-CM

## 2023-08-02 DIAGNOSIS — F43.10 PTSD (POST-TRAUMATIC STRESS DISORDER): ICD-10-CM

## 2023-08-02 DIAGNOSIS — F40.10 SOCIAL ANXIETY DISORDER: ICD-10-CM

## 2023-08-02 DIAGNOSIS — F90.0 ADHD, PREDOMINANTLY INATTENTIVE TYPE: ICD-10-CM

## 2023-08-02 DIAGNOSIS — F41.0 PANIC ATTACKS: ICD-10-CM

## 2023-08-02 NOTE — PSYCH
Behavioral Health Psychotherapy Progress Note    Psychotherapy Provided: Individual Psychotherapy     1. Severe episode of recurrent major depressive disorder, without psychotic features ([address])        2. PTSD (post-traumatic stress disorder)        3. Panic attacks        4. ADHD, predominantly inattentive type        5. Social anxiety disorder            Goals addressed in session: Goal 1, Goal 2 and Goal 3      DATA: My mind goes every which way and initiating things is tough. He feels his depression is not bad. He is trying to lessen his debt. He does feel the adderall is helping him with his ADHD. His girlfriend finally got a job which is helpful. He His motivation is good but he still has focus issues. His social anxiety is better. He claims he is better with himself. His self confidence is better. He has less panic attacks. He is more calm and relaxed. If he does not know people that can still discombobulate him. He talked about possible summer travel. We discussed strategies to cope. During this session, this clinician used the following therapeutic modalities: Client-centered Therapy, Cognitive Behavioral Therapy, Mindfulness-based Strategies and Supportive Psychotherapy    Substance Abuse was not addressed during this session. If the client is diagnosed with a co-occurring substance use disorder, please indicate any changes in the frequency or amount of use: n/a. Stage of change for addressing substance use diagnoses: No substance use/Not applicable    ASSESSMENT:  Carmenza Ulloa presents with a Euthymic/ normal mood. his affect is Normal range and intensity, which is congruent, with his mood and the content of the session. The client has made progress on their goals. Carmenza Ulloa presents with a none risk of suicide, none risk of self-harm, and none risk of harm to others. For any risk assessment that surpasses a "low" rating, a safety plan must be developed.     A safety plan was indicated: no  If yes, describe in detail n/a    PLAN: Between sessions, Radha Pederson will use mindfulness and CBT. At the next session, the therapist will use Client-centered Therapy, Cognitive Behavioral Therapy, Mindfulness-based Strategies and Supportive Psychotherapy to address issues and symptoms as they may arise. .    Behavioral Health Treatment Plan and Discharge Planning: Radha Pederson is aware of and agrees to continue to work on their treatment plan. They have identified and are working toward their discharge goals.  yes    Visit start and stop times:    08/02/23  Start Time: 1410  Stop Time: 1500  Total Visit Time: 50 minutes

## 2023-08-17 DIAGNOSIS — F98.8 ATTENTION DEFICIT DISORDER (ADD) WITHOUT HYPERACTIVITY: ICD-10-CM

## 2023-08-17 RX ORDER — DEXTROAMPHETAMINE SACCHARATE, AMPHETAMINE ASPARTATE MONOHYDRATE, DEXTROAMPHETAMINE SULFATE AND AMPHETAMINE SULFATE 5; 5; 5; 5 MG/1; MG/1; MG/1; MG/1
20 CAPSULE, EXTENDED RELEASE ORAL EVERY MORNING
Qty: 30 CAPSULE | Refills: 0 | Status: SHIPPED | OUTPATIENT
Start: 2023-08-17 | End: 2023-08-18 | Stop reason: DRUGHIGH

## 2023-08-17 NOTE — TELEPHONE ENCOUNTER
PDMP website reviewed. Justin Brody has been appropriately adherent to controlled psychotropic medications without evidence of abuse or misuse. As such, will send 30-day refill to pharmacy of choice and follow up as necessary.

## 2023-08-17 NOTE — TELEPHONE ENCOUNTER
Medication Refill Request     Name of Medication Adderall XR  Dose/Frequency 20mg take 1 capsule by mouth every morning  Quantity 30  Verified pharmacy   [x]  Verified ordering Provider   [x]  Does patient have enough for the next 3 days? Yes [] No [x]  Does patient have a follow-up appointment scheduled?  Yes [x] No []   If so when is appointment: 8/24/2023 11am

## 2023-08-18 ENCOUNTER — TELEPHONE (OUTPATIENT)
Dept: PSYCHIATRY | Facility: CLINIC | Age: 43
End: 2023-08-18

## 2023-08-18 DIAGNOSIS — F90.0 ATTENTION DEFICIT HYPERACTIVITY DISORDER (ADHD), PREDOMINANTLY INATTENTIVE TYPE: ICD-10-CM

## 2023-08-18 RX ORDER — DEXTROAMPHETAMINE SACCHARATE, AMPHETAMINE ASPARTATE MONOHYDRATE, DEXTROAMPHETAMINE SULFATE AND AMPHETAMINE SULFATE 6.25; 6.25; 6.25; 6.25 MG/1; MG/1; MG/1; MG/1
25 CAPSULE, EXTENDED RELEASE ORAL EVERY MORNING
Qty: 30 CAPSULE | Refills: 0 | Status: SHIPPED | OUTPATIENT
Start: 2023-08-18

## 2023-08-18 NOTE — TELEPHONE ENCOUNTER
Writer contacted the patient to inform him that he would need to call around to alternate pharmacies and find one that has his current dosage of Adderall Xr 20 mg in stock. Patient expressed to the writer he was originally being prescribed Adderall 25 mg capsules but got the script change because he could not locate a pharmacy that has his medication in stock. Writer transferred call to the nursing department for assistance.

## 2023-08-18 NOTE — TELEPHONE ENCOUNTER
Patient contacted the office stating the pharmacy only has Adderall XR 25 mg capsules. Patient is requesting the provider send over a script for that dosage to the pharmacy so they can fill prescription.

## 2023-08-18 NOTE — TELEPHONE ENCOUNTER
Called to review requested prescription for 25 mg was sent to [address] [place] [address], but the mail box was not set up. [facility]  Heart Failure Clinic Progress Note      Reason or Visit: Enrollment and hosp f/u     Referred By: daily list and Ahsan and discharge list    BP Readings from Last 6 Encounters:   04/14/22 118/64   04/14/22 136/71   04/11/22 (!) 160/80   03/18/22 (!) 142/64   01/07/22 (!) 156/80   11/12/21 (!) 140/84     Pulse Readings from Last 6 Encounters:   04/14/22 68   04/14/22 60   04/11/22 88   03/18/22 82   01/07/22 72   11/12/21 88     Wt Readings from Last 6 Encounters:   04/14/22 93.9 kg (207 lb)   04/14/22 93.6 kg (206 lb 5.6 oz)   04/11/22 100.2 kg (221 lb)   03/18/22 91.2 kg (201 lb)   01/07/22 98.4 kg (217 lb)   11/12/21 93.3 kg (205 lb 12.8 oz)         Visit History:     4/21/22 in CHF Clinic Enrollment visit, melo pt who is a retired , does a lot of home improvement projects, active. Adivsed reduce Na in diet, advised BMP today on spiron and KCL from hospital. CPM    4/11/22 DR Sherman sent pt to ER very fluid overloaded with LIZA, abd swelling, sob.    ER/Hospitalization History:     [facility] 4/11 to 4/14/2022 in with sob, LIZA, abd swelling. 20 lb wt gain. pBNP was 2625. Acute HF. Got IV lasix. Card rec considering CRT given LVD and high pacing rate. Reduce EF    Past Medical History:   Diagnosis Date   • Allergic rhinitis 11/9/2010   • Arthritis    • Back pain    • Baker's cyst of knee, right 12/1/2015   • Benign colonic polyp 4/9/2009   • Benign hypertensive heart and kidney disease with chronic kidney disease 3/26/2015   • Bleeding from varicose veins of right lower extremity 12/21/2016   • CAD S/P percutaneous coronary angioplasty 9/19/2017   • Cardiomegaly 2/2/2012   • Cataract    • Chronic gout 7/24/2014   • Chronic kidney disease, stage 3 (moderate) 7/15/2013   • Chronic kidney disease, stage II (mild) 11/5/2013   • Colon polyp    • Controlled type 2 diabetes with renal manifestation 11/9/2010   • Cough 4/8/2019   • Diabetes mellitus (CMS/HCC)    • Diabetes  mellitus with peripheral artery disease (CMS/[facility]) 5/8/2019   • Diabetic cataract (CMS/[facility]) 6/13/2017   • Diverticulosis of colon 9/24/2010   • DM (diabetes mellitus), type 2 with renal complications (CMS/[facility]) 3/26/2015   • Edema of both legs 4/8/2019   • Encounter for laboratory test 2/23/2011   • Essential (primary) hypertension    • Hearing loss 6/12/2012   • Hyperlipidemia    • Hyperlipidemia associated with type 2 diabetes mellitus (CMS/[facility]) 4/9/2009   • Internal hemorrhoids 9/24/2010   • Irregular heart rate    • Kidney disease    • Need for immunization against influenza 9/10/2009   • Nephropathy associated with another disease 8/10/2010   • Obstructive sleep apnea 4/9/2009   • Onychomycosis of toenail 6/9/2010   • Osteoarthritis of lumbar spine 6/13/2017   • Peripheral arterial disease (CMS/[facility]) 5/8/2019   • Polyneuropathy due to medical condition (CMS/[facility]) 6/9/2010   • Post herpetic neuralgia 3/20/2018   • Pulmonary hypertension (CMS/[facility]) 6/12/2012   • Rib pain on right side 4/8/2019   • Right lower quadrant abdominal pain 5/8/2019   • Scoliosis 6/13/2017   • Simple chronic bronchitis (CMS/[facility])    • Systolic dysfunction 6/27/2016   • Thrombocytopenia, unspecified (CMS/[facility]) 3/20/2019   • Type 2 diabetes mellitus with neurologic complication (CMS/[facility]) 5/22/2012   • Unspecified atrial fibrillation (CMS/[facility]) 6/12/2012   • Varicose vein of leg 2/11/2016   • Vitamin D deficiency 9/18/2014       Past Surgical History:   Procedure Laterality Date   • ------------other-------------  06/17/2019    Excisional biopsy of right inguinal lymph nodes   • Cataract extraction, bilateral     • Coronary angioplasty with stent placement  2014   • Penile prosthesis implant         Pertinent Cardiodiagnostics:     4/11/21 echo showed EF of 35%. Grade I shahid dys. LA dil. Mod to severe TR. Nl RVSF    2/16/22 nuclear stress showed neg sig ischemia. EF 43%.    6/9/2020 echo showed EF of 55 to 60%. Grade II shahid dys    HPI    Home  Monitoring/Diet/Exercise:  • Daily weights: adherent, stable  199  • Sodium restriction: questionable, think he likes salty meats  • Exercise: didn't ask    HF Symptom Assessment:   • General: complains of fatigue mild  • Shortness of breath: None walking into exam, in house, with ADLs, none with 7 stairs, none to mild walking a lot to stores  • Functional capacity: stable, <1 block  • Orthopnea: denies, 1 pillow  • PND: denies, none  • GI:denies abdominal distention, ascites is not present   • LE edema: stable, trace  • Cough: reports in spells, dry  • Chest pain: denies  • Dizziness/lightheadedness: denies, none    The patient has not been evaluated for RAMSEY. The patient is adherent to CPAP therapy: None of the time.    Medication adherence:   Medications reconciled per medication list. Patient reports missing 0 medication doses in the last week. Patient does use a pillbox. The patient did take her medications today.    Current Outpatient Medications   Medication Sig Dispense Refill   • apixaBAN (ELIQUIS) 5 MG Tab Take 5 mg by mouth 2 times daily.     • metoPROLOL succinate (TOPROL-XL) 25 MG 24 hr tablet Take 1 tablet by mouth daily. 30 tablet 3   • spironolactone (ALDACTONE) 25 MG tablet Take 1 tablet by mouth daily. Do not start before April 15, 2022. 30 tablet 0   • glipiZIDE (GLUCOTROL) 10 MG tablet Take one tablet in am and 1/2 tablet in  tablet 3   • simvastatin (ZOCOR) 20 MG tablet Take 1 tablet by mouth at bedtime. 90 tablet 3   • irbesartan (AVAPRO) 300 MG tablet Take 1 tablet by mouth nightly. Instead of the losartan 90 tablet 3   • torsemide (DEMADEX) 20 MG tablet Take 2 tablets by mouth daily. 60 tablet 11   • potassium chloride (KLOR-CON) 10 MEQ ER tablet Take 1 tablet by mouth daily. 90 tablet 3   • Accu-Chek FastClix Lancets Misc      • Accu-Chek SmartView test strip      • allopurinol (ZYLOPRIM) 300 MG tablet Take 1 tablet by mouth daily. 90 tablet 3   • aspirin 81 MG EC tablet Take 1 tablet  by mouth daily. 100 tablet 0   • Multiple Vitamins-Minerals (ONE DAILY MENS 50+ MULTIVIT PO)        No current facility-administered medications for this visit.       Physical Exam  Cardiovascular:      Rate and Rhythm: Regular rhythm.   Pulmonary:      Breath sounds: Normal breath sounds.   Abdominal:      Palpations: Abdomen is soft.   Musculoskeletal:      Right lower leg: Edema present.      Left lower leg: Edema present.      Comments: Trace cindy ankle           LABS:  Recent Lab results:   Sodium (mmol/L)   Date Value   04/14/2022 138     Potassium (mmol/L)   Date Value   04/14/2022 3.5     BUN (mg/dL)   Date Value   04/14/2022 28 (H)     Creatinine (mg/dL)   Date Value   04/14/2022 1.18 (H)     Glomerular Filtration Rate (no units)   Date Value   04/14/2022 64         ASSESSMENT/PLAN:    HFrEF  Stage B, NYHA FC II   Compensated  and euvolemic  Etiology ICM  -Recommend:    CHF Clinic Enrollment visit, melo michael who is a retired , does a lot of home improvement projects, active. Adivsed reduce Na in diet, advised BMP today on spiron and KCL from hospital. CPM    Hypertension  Goal BP <130/80  -BP today is at goal    Counseling/education provided at today’s visit:   how to contact HF clinic  importance of medication adherence to improve heart function  low sodium diet  perform daily weights  call clinic if any new symptoms of shortness of breath or leg swelling  call clinic if weight gain of 3 lbs in 7 days or less  call clinic if any new sypmtoms of dizziness or light headedness      FOLLOW-UP  The patient will return to HF clinic on 5/11.    TIME SPENT ON VISIT  70 minutes were spent in face-to-face discussion related to the medical management of the patient.     Order Date: 4/21/2022    Valeria Duncan RN

## 2023-08-18 NOTE — TELEPHONE ENCOUNTER
Sharron called and lm stating the [facility] on [address] has his 25 mg prescription in stock. Previous prescription was 20 mg because of national shortage but it should be 25 mg.            Adra Becka- [phone]

## 2023-08-18 NOTE — TELEPHONE ENCOUNTER
Thanks for looking into that [employer]. Will send Rx for Adderall XR 25 mg to pharmacy on file.

## 2023-08-18 NOTE — TELEPHONE ENCOUNTER
Unfortunately, he will have to call around to alternate pharmacies to find one that has his current dose of Adderall XR 20 mg. It is not acceptable to increase doses of controlled substances based on availability. Another option is Adderall XR 10 mg tablets (2 tablets). Nursing- can you please call and explain to patient.

## 2023-08-23 ENCOUNTER — SOCIAL WORK (OUTPATIENT)
Dept: BEHAVIORAL/MENTAL HEALTH CLINIC | Facility: CLINIC | Age: 43
End: 2023-08-23
Payer: COMMERCIAL

## 2023-08-23 DIAGNOSIS — F41.0 PANIC ATTACKS: ICD-10-CM

## 2023-08-23 DIAGNOSIS — F43.10 PTSD (POST-TRAUMATIC STRESS DISORDER): ICD-10-CM

## 2023-08-23 DIAGNOSIS — F90.0 ADHD, PREDOMINANTLY INATTENTIVE TYPE: ICD-10-CM

## 2023-08-23 DIAGNOSIS — F33.2 SEVERE EPISODE OF RECURRENT MAJOR DEPRESSIVE DISORDER, WITHOUT PSYCHOTIC FEATURES (HCC): Primary | ICD-10-CM

## 2023-08-23 DIAGNOSIS — F40.10 SOCIAL ANXIETY DISORDER: ICD-10-CM

## 2023-08-23 PROCEDURE — 90834 PSYTX W PT 45 MINUTES: CPT | Performed by: SOCIAL WORKER

## 2023-08-23 NOTE — PSYCH
Behavioral Health Psychotherapy Progress Note    Psychotherapy Provided: Individual Psychotherapy     1. Severe episode of recurrent major depressive disorder, without psychotic features ([address])        2. ADHD, predominantly inattentive type        3. PTSD (post-traumatic stress disorder)        4. Social anxiety disorder        5. Panic attacks            Goals addressed in session: Goal 1 only today    DATA: He talked about his birthday and his partner did nothing for him and made his birthday very difficult. He discussed the toxicity of the relationship. Its not the fact she did not buy something it is the toxic air and vibe she put out on his birthday. This affects his mood and adds to depression. He discussed some of what he calls her outlandish behaviors. I recommended couples therapy. He shared she takes no accountability for anything in the relationship. I provided support and strategies to cope. I gave him suggestions of how to deal with her today since they have not talked for a few days. During this session, this clinician used the following therapeutic modalities: Client-centered Therapy, Cognitive Behavioral Therapy, Mindfulness-based Strategies and Supportive Psychotherapy    Substance Abuse was addressed during this session. If the client is diagnosed with a co-occurring substance use disorder, please indicate any changes in the frequency or amount of use: He smokes Marijuana. Stage of change for addressing substance use diagnoses: Pre-contemplation    ASSESSMENT:  Ella Mcdonald presents with a Angry mood. his affect is angry, which is congruent, with his mood and the content of the session. The client has made progress on their goals. Ella Mcdonald presents with a none risk of suicide, none risk of self-harm, and none risk of harm to others. For any risk assessment that surpasses a "low" rating, a safety plan must be developed.     A safety plan was indicated: no  If yes, describe in detail n/a    PLAN: Between sessions, Gurpreet Newman will use mindfulness and CBT. At the next session, the therapist will use Client-centered Therapy, Cognitive Behavioral Therapy, Mindfulness-based Strategies and Supportive Psychotherapy to address issues and symptoms as they may arise. .    Behavioral Health Treatment Plan and Discharge Planning: Gurpreet Newman is aware of and agrees to continue to work on their treatment plan. They have identified and are working toward their discharge goals.  yes    Visit start and stop times:    08/23/23  Start Time: 1510  Stop Time: 1600  Total Visit Time: 50 minutes

## 2023-08-24 ENCOUNTER — TELEPHONE (OUTPATIENT)
Dept: PSYCHIATRY | Facility: CLINIC | Age: 43
End: 2023-08-24
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
## 2023-08-24 NOTE — TELEPHONE ENCOUNTER
Patient is calling regarding cancelling an appointment. Date/Time: 8/24/23 at 11:00a    Reason: Heavy traffic on [address]. Patient will not make it to the appt on time.     Patient was rescheduled: YES [x] NO []  If yes, when was Patient reschedule for: 8/25/23 at 3:00p    Patient requesting call back to reschedule: YES [] NO [x]

## 2023-08-25 ENCOUNTER — OFFICE VISIT (OUTPATIENT)
Dept: PSYCHIATRY | Facility: CLINIC | Age: 43
End: 2023-08-25

## 2023-08-25 DIAGNOSIS — F41.9 ANXIETY: ICD-10-CM

## 2023-08-25 DIAGNOSIS — F90.2 ATTENTION DEFICIT HYPERACTIVITY DISORDER (ADHD), COMBINED TYPE: Primary | ICD-10-CM

## 2023-08-25 DIAGNOSIS — F33.0 MILD EPISODE OF RECURRENT MAJOR DEPRESSIVE DISORDER (HCC): ICD-10-CM

## 2023-09-06 ENCOUNTER — TELEPHONE (OUTPATIENT)
Dept: PSYCHIATRY | Facility: CLINIC | Age: 43
End: 2023-09-06

## 2023-09-06 NOTE — TELEPHONE ENCOUNTER
Patient is calling regarding cancelling an appointment.     Date/Time: 9/6/2023 3pm    Reason: sick    Patient was rescheduled: YES [] NO [x]  If yes, when was Patient reschedule for:     Patient requesting call back to reschedule: YES [] NO [x]

## 2023-09-27 ENCOUNTER — TELEPHONE (OUTPATIENT)
Dept: PSYCHIATRY | Facility: CLINIC | Age: 43
End: 2023-09-27

## 2023-09-27 NOTE — TELEPHONE ENCOUNTER
Patient is calling regarding cancelling an appointment. Date/Time: 9/27 @ 3pm    Reason: pt sick    Patient was rescheduled: YES [] NO [x]  If yes, when was Patient reschedule for:   Patient requesting call back to reschedule: YES [] NO [x]    Pt will see provider at next appt.

## 2023-09-28 ENCOUNTER — OFFICE VISIT (OUTPATIENT)
Dept: FAMILY MEDICINE CLINIC | Facility: CLINIC | Age: 43
End: 2023-09-28
Payer: COMMERCIAL

## 2023-09-28 VITALS
HEIGHT: 73 IN | TEMPERATURE: 97 F | DIASTOLIC BLOOD PRESSURE: 78 MMHG | HEART RATE: 64 BPM | BODY MASS INDEX: 24.15 KG/M2 | OXYGEN SATURATION: 100 % | SYSTOLIC BLOOD PRESSURE: 118 MMHG | WEIGHT: 182.2 LBS

## 2023-09-28 DIAGNOSIS — F33.2 SEVERE EPISODE OF RECURRENT MAJOR DEPRESSIVE DISORDER, WITHOUT PSYCHOTIC FEATURES (HCC): ICD-10-CM

## 2023-09-28 DIAGNOSIS — G62.9 NEUROPATHY: Primary | ICD-10-CM

## 2023-09-28 PROCEDURE — 99214 OFFICE O/P EST MOD 30 MIN: CPT | Performed by: FAMILY MEDICINE

## 2023-09-28 NOTE — PROGRESS NOTES
Name: Melissa Wells      : 1980      MRN: [id number]  Encounter Provider: Ekta Esparza DO  Encounter Date: 2023   Encounter department: [facility]     1. Neuropathy  -     Hemoglobin A1C  -     Comprehensive metabolic panel  -     Magnesium  -     Anti-DNA antibody, double-stranded; Future  -     C-reactive protein; Future  -     Rheumatoid factor; Future  -     Sedimentation rate, automated; Future  -     Uric acid; Future    2. Severe episode of recurrent major depressive disorder, without psychotic features ([address])    Will check labs I will call with results. Did encourage him to quit smoking. Otherwise follow-up as needed. Subjective      Patient presents with:  Numbness: Pt c/o numbness on 2 fingertips on Rt hand for 2 days. Pt has no other concerns. LS        Review of Systems   Constitutional: Negative. HENT: Negative. Eyes: Negative. Respiratory: Negative. Cardiovascular: Negative. Gastrointestinal: Negative. Endocrine: Negative. Genitourinary: Negative. Musculoskeletal: Negative. As noted in HPI. Skin: Negative. Allergic/Immunologic: Negative. Neurological: Negative. Hematological: Negative. Psychiatric/Behavioral: Negative. All other systems reviewed and are negative.       Current Outpatient Medications on File Prior to Visit   Medication Sig   • amphetamine-dextroamphetamine (ADDERALL XR, 25MG,) 25 MG 24 hr capsule Take 1 capsule (25 mg total) by mouth every morning Max Daily Amount: 25 mg   • betamethasone dipropionate (DIPROSONE) 0.05 % ointment Apply topically 2 (two) times a day   • ergocalciferol (VITAMIN D2) 50,000 units Take 1 capsule (50,000 Units total) by mouth once a week   • prazosin (MINIPRESS) 1 mg capsule Take 1 capsule (1 mg total) by mouth daily at bedtime (Patient not taking: Reported on 2023)       Objective     /78 (BP Location: Right arm, Patient Position: Sitting, Cuff Size: Large)   Pulse 64   Temp (!) 97 °F (36.1 °C) (Tympanic)   Ht 6' 1" (1.854 m)   Wt 82.6 kg (182 lb 3.2 oz)   SpO2 100%   BMI 24.04 kg/m²     Physical Exam  Vitals and nursing note reviewed. Constitutional:       Appearance: He is well-developed. HENT:      Head: Normocephalic and atraumatic. Eyes:      Pupils: Pupils are equal, round, and reactive to light. Cardiovascular:      Rate and Rhythm: Normal rate. Pulmonary:      Effort: Pulmonary effort is normal.      Breath sounds: No wheezing. Abdominal:      Palpations: Abdomen is soft. Musculoskeletal:         General: No swelling or tenderness. Normal range of motion. Right hand: Normal.      Left hand: Normal.      Cervical back: Normal range of motion and neck supple. Right lower leg: No edema. Left lower leg: No edema. Lymphadenopathy:      Cervical: No cervical adenopathy. Skin:     General: Skin is warm. Neurological:      Mental Status: He is alert and oriented to person, place, and time.        Ekta Holiday, DO

## 2023-10-05 DIAGNOSIS — F90.0 ATTENTION DEFICIT HYPERACTIVITY DISORDER (ADHD), PREDOMINANTLY INATTENTIVE TYPE: ICD-10-CM

## 2023-10-05 RX ORDER — DEXTROAMPHETAMINE SACCHARATE, AMPHETAMINE ASPARTATE MONOHYDRATE, DEXTROAMPHETAMINE SULFATE AND AMPHETAMINE SULFATE 6.25; 6.25; 6.25; 6.25 MG/1; MG/1; MG/1; MG/1
25 CAPSULE, EXTENDED RELEASE ORAL EVERY MORNING
Qty: 30 CAPSULE | Refills: 0 | Status: SHIPPED | OUTPATIENT
Start: 2023-10-05

## 2023-10-05 NOTE — TELEPHONE ENCOUNTER
Renate Jarvis called the clinic today and appropriately requested refill of controlled psychotropic medications (Adderall). Review of PDMP website reveals no concerns for abuse or misuse. As such, will refill script today for 30-days as I am covering for patient's primary provider.

## 2023-10-05 NOTE — TELEPHONE ENCOUNTER
Medication Refill Request     Name of Medication ADDERALL XR  Dose/Frequency 25 mg/ Take 1 capsule by mouth every morning. Quantity 30  Verified pharmacy   [x]  Verified ordering Provider   [x]  Does patient have enough for the next 3 days? Yes [] No [x]  Does patient have a follow-up appointment scheduled?  Yes [x] No []   If so when is appointment: 11/15/2023

## 2023-10-06 ENCOUNTER — APPOINTMENT (OUTPATIENT)
Dept: LAB | Facility: HOSPITAL | Age: 43
End: 2023-10-06
Payer: COMMERCIAL

## 2023-10-06 DIAGNOSIS — Z00.00 ANNUAL PHYSICAL EXAM: ICD-10-CM

## 2023-10-06 DIAGNOSIS — G62.9 NEUROPATHY: ICD-10-CM

## 2023-10-06 LAB
25(OH)D3 SERPL-MCNC: 64.5 NG/ML (ref 30–100)
ALBUMIN SERPL BCP-MCNC: 4.4 G/DL (ref 3.5–5)
ALP SERPL-CCNC: 63 U/L (ref 34–104)
ALT SERPL W P-5'-P-CCNC: 19 U/L (ref 7–52)
ANION GAP SERPL CALCULATED.3IONS-SCNC: 5 MMOL/L
AST SERPL W P-5'-P-CCNC: 17 U/L (ref 13–39)
BILIRUB SERPL-MCNC: 0.41 MG/DL (ref 0.2–1)
BUN SERPL-MCNC: 16 MG/DL (ref 5–25)
CALCIUM SERPL-MCNC: 8.6 MG/DL (ref 8.4–10.2)
CHLORIDE SERPL-SCNC: 107 MMOL/L (ref 96–108)
CO2 SERPL-SCNC: 27 MMOL/L (ref 21–32)
CREAT SERPL-MCNC: 1.07 MG/DL (ref 0.6–1.3)
CRP SERPL QL: 3.2 MG/L
ERYTHROCYTE [SEDIMENTATION RATE] IN BLOOD: 6 MM/HOUR (ref 0–14)
EST. AVERAGE GLUCOSE BLD GHB EST-MCNC: 126 MG/DL
GFR SERPL CREATININE-BSD FRML MDRD: 85 ML/MIN/1.73SQ M
GLUCOSE P FAST SERPL-MCNC: 84 MG/DL (ref 65–99)
HBA1C MFR BLD: 6 %
MAGNESIUM SERPL-MCNC: 1.9 MG/DL (ref 1.9–2.7)
POTASSIUM SERPL-SCNC: 4.1 MMOL/L (ref 3.5–5.3)
PROT SERPL-MCNC: 7.1 G/DL (ref 6.4–8.4)
SODIUM SERPL-SCNC: 139 MMOL/L (ref 135–147)
TSH SERPL DL<=0.05 MIU/L-ACNC: 1.8 UIU/ML (ref 0.45–4.5)
URATE SERPL-MCNC: 4.4 MG/DL (ref 3.5–8.5)

## 2023-10-06 PROCEDURE — 86430 RHEUMATOID FACTOR TEST QUAL: CPT

## 2023-10-06 PROCEDURE — 82306 VITAMIN D 25 HYDROXY: CPT

## 2023-10-06 PROCEDURE — 83735 ASSAY OF MAGNESIUM: CPT | Performed by: FAMILY MEDICINE

## 2023-10-06 PROCEDURE — 80053 COMPREHEN METABOLIC PANEL: CPT | Performed by: FAMILY MEDICINE

## 2023-10-06 PROCEDURE — 83036 HEMOGLOBIN GLYCOSYLATED A1C: CPT | Performed by: FAMILY MEDICINE

## 2023-10-06 PROCEDURE — 86225 DNA ANTIBODY NATIVE: CPT

## 2023-10-06 PROCEDURE — 84443 ASSAY THYROID STIM HORMONE: CPT

## 2023-10-06 PROCEDURE — 86140 C-REACTIVE PROTEIN: CPT

## 2023-10-06 PROCEDURE — 36415 COLL VENOUS BLD VENIPUNCTURE: CPT

## 2023-10-06 PROCEDURE — 85652 RBC SED RATE AUTOMATED: CPT

## 2023-10-06 PROCEDURE — 84550 ASSAY OF BLOOD/URIC ACID: CPT

## 2023-10-07 LAB
DSDNA AB SER-ACNC: <1 IU/ML (ref 0–9)
RHEUMATOID FACT SER QL LA: NEGATIVE

## 2023-10-08 DIAGNOSIS — E55.9 VITAMIN D DEFICIENCY: ICD-10-CM

## 2023-10-09 RX ORDER — ERGOCALCIFEROL 1.25 MG/1
50000 CAPSULE ORAL WEEKLY
Qty: 12 CAPSULE | Refills: 0 | Status: SHIPPED | OUTPATIENT
Start: 2023-10-09

## 2023-10-11 ENCOUNTER — TELEPHONE (OUTPATIENT)
Dept: FAMILY MEDICINE CLINIC | Facility: CLINIC | Age: 43
End: 2023-10-11

## 2023-10-11 NOTE — TELEPHONE ENCOUNTER
All his lab work was normal except that the hemoglobin A1c was a few points high at 6.0. He should follow-up with me in the office at his convenience so we can discuss treatment for possible elevated blood sugar. The only other thing in his lab work that was elevated was the C-reactive protein and it was only very mildly elevated and of no consequence. This will be best discussed in person.   Please asked patient to make a follow-up appointment with me.    ----- Message from Madeline Carrizales sent at 10/11/2023  8:37 AM EDT -----  Regarding: Conclusion   Contact: [phone]  Please see patient concern    Thank you      ----- Message -----  From: Anum Jacobo: 10/11/2023   8:24 AM EDT  To: [facility]  Subject: Conclusion                                       Hey doc .what going on with my test results  I see it say something high. & my fingers tips still3 feel the  same

## 2023-10-12 ENCOUNTER — OFFICE VISIT (OUTPATIENT)
Dept: FAMILY MEDICINE CLINIC | Facility: CLINIC | Age: 43
End: 2023-10-12
Payer: COMMERCIAL

## 2023-10-12 VITALS
TEMPERATURE: 97.4 F | WEIGHT: 179.2 LBS | HEART RATE: 90 BPM | HEIGHT: 73 IN | DIASTOLIC BLOOD PRESSURE: 76 MMHG | BODY MASS INDEX: 23.75 KG/M2 | OXYGEN SATURATION: 99 % | SYSTOLIC BLOOD PRESSURE: 118 MMHG

## 2023-10-12 DIAGNOSIS — E55.9 VITAMIN D DEFICIENCY: Primary | ICD-10-CM

## 2023-10-12 DIAGNOSIS — F33.2 SEVERE EPISODE OF RECURRENT MAJOR DEPRESSIVE DISORDER, WITHOUT PSYCHOTIC FEATURES (HCC): ICD-10-CM

## 2023-10-12 PROCEDURE — 99213 OFFICE O/P EST LOW 20 MIN: CPT | Performed by: FAMILY MEDICINE

## 2023-10-12 NOTE — PROGRESS NOTES
Assessment/Plan:      Diagnoses and all orders for this visit:    Vitamin D deficiency    Severe episode of recurrent major depressive disorder, without psychotic features ([address])          Subjective:     Patient ID: Nirali Beasley is a 44 y.o. male. He presents today to review his lab work. We noted his hemoglobin A1c was 6 the rest of his labs look good. Review of Systems   Constitutional: Negative. Musculoskeletal:         Numc/o numb finger tips  bness in tips of fingers.          Objective:     Physical Exam

## 2023-10-12 NOTE — PROGRESS NOTES
Assessment/Plan:      Diagnoses and all orders for this visit:    Vitamin D deficiency    Severe episode of recurrent major depressive disorder, without psychotic features ([address])        I reviewed the lab work with the patient once we got the computer working again. I reassured him that there is nothing in his lab work that would explain him his numbness. Hemoglobin A1c of 6.0. I asked him to follow his diet closely and we will recheck that in 6 months. Subjective:     Patient ID: Sussy Marsh is a 44 y.o. male. Get note I am doing for this visit. There may be another note floating somewhere in space. 3 1 seems to be able to figure it out. This dictation is not working well either but I am not going to fix it. Follow-up on his lab work. He is concerned about some abnormal values. Review of Systems   Constitutional: Negative. HENT: Negative. Respiratory: Negative. Cardiovascular: Negative. Neurological:         Numbness in his fingers and his toes. Objective:     Physical Exam  Vitals and nursing note reviewed. Constitutional:       Appearance: He is well-developed. HENT:      Head: Normocephalic and atraumatic. Eyes:      Pupils: Pupils are equal, round, and reactive to light. Cardiovascular:      Rate and Rhythm: Normal rate. Pulmonary:      Effort: Pulmonary effort is normal.      Breath sounds: No wheezing. Abdominal:      Palpations: Abdomen is soft. Musculoskeletal:      Cervical back: Normal range of motion and neck supple. Lymphadenopathy:      Cervical: No cervical adenopathy. Skin:     General: Skin is warm. Neurological:      General: No focal deficit present. Mental Status: He is alert and oriented to person, place, and time.    Psychiatric:         Mood and Affect: Mood normal.

## 2023-10-18 ENCOUNTER — SOCIAL WORK (OUTPATIENT)
Dept: BEHAVIORAL/MENTAL HEALTH CLINIC | Facility: CLINIC | Age: 43
End: 2023-10-18
Payer: COMMERCIAL

## 2023-10-18 DIAGNOSIS — F33.2 SEVERE EPISODE OF RECURRENT MAJOR DEPRESSIVE DISORDER, WITHOUT PSYCHOTIC FEATURES (HCC): ICD-10-CM

## 2023-10-18 DIAGNOSIS — F90.0 ADHD, PREDOMINANTLY INATTENTIVE TYPE: Primary | ICD-10-CM

## 2023-10-18 DIAGNOSIS — F41.0 PANIC ATTACKS: ICD-10-CM

## 2023-10-18 DIAGNOSIS — F43.10 PTSD (POST-TRAUMATIC STRESS DISORDER): ICD-10-CM

## 2023-10-18 DIAGNOSIS — F40.10 SOCIAL ANXIETY DISORDER: ICD-10-CM

## 2023-10-18 PROCEDURE — 90834 PSYTX W PT 45 MINUTES: CPT | Performed by: SOCIAL WORKER

## 2023-10-18 NOTE — PSYCH
Behavioral Health Psychotherapy Progress Note    Psychotherapy Provided: Individual Psychotherapy     1. ADHD, predominantly inattentive type        2. Severe episode of recurrent major depressive disorder, without psychotic features ([address])        3. Social anxiety disorder        4. Panic attacks        5. PTSD (post-traumatic stress disorder)            Goals addressed in session: Goal 1, Goal 2, and Goal 3      DATA: Bryant Fu arrived for his session. He feels he is not overly depressed. However he is anxious over how his motivation is affected by his ADHD and how his anxiety is high over the fact he has difficulty focusing especially on things he does not want to do and how he procrastinates. He is going to bring up to his medication provider these concerns. He claims he and his partner although not good are at a stalmate and tolerate each other. He talked about future goals but even in the pursuit of those goals he is frustrated with his lack of motivation and his tendency to procrastinate. I provided support, guidance, insight and strategies to cope. During this session, this clinician used the following therapeutic modalities: Client-centered Therapy, Cognitive Behavioral Therapy, Mindfulness-based Strategies, and Supportive Psychotherapy    Substance Abuse was not addressed during this session. If the client is diagnosed with a co-occurring substance use disorder, please indicate any changes in the frequency or amount of use: Patient smokes marijuana but does not intend on quitting. . Stage of change for addressing substance use diagnoses: n/a    ASSESSMENT:  Romel Walker presents with a anxious mood. his affect is anxious which is congruent, with his mood and the content of the session. The client has made progress on their goals. However he is still anxious about how his ADHD is affecting him.   Romel Walker presents with a none risk of suicide, none risk of self-harm, and none risk of harm to others. For any risk assessment that surpasses a "low" rating, a safety plan must be developed. A safety plan was indicated: no  If yes, describe in detail n/a    PLAN: Between sessions, Skyla Lopez will use mindfulness and CBT. At the next session, the therapist will use Client-centered Therapy, Cognitive Behavioral Therapy, Mindfulness-based Strategies, and Supportive Psychotherapy to address issues . Behavioral Health Treatment Plan and Discharge Planning: Skyla Lopez is aware of and agrees to continue to work on their treatment plan. They have identified and are working toward their discharge goals.  yes    Visit start and stop times:    10/18/23  Start Time: 1310  Stop Time: 1400  Total Visit Time: 50 minutes

## 2023-10-19 NOTE — BH TREATMENT PLAN
Outpatient Behavioral Health Psychotherapy Treatment Plan    Katelyn Jefferson  1980     Date of Initial Psychotherapy Assessment:08/18/2022  Date of Current Treatment Plan: 10/18/2023  Treatment Plan Target Date: 04/10/2024  Treatment Plan Expiration Date: 04/10/2024    Diagnosis:   1. ADHD, predominantly inattentive type        2. Severe episode of recurrent major depressive disorder, without psychotic features ([address])        3. Social anxiety disorder        4. Panic attacks        5. PTSD (post-traumatic stress disorder)            Area(s) of Need: Please see below    Long Term Goal 1 (in the client's own words):I still need to more effectively manage my depression    Stage of Change: Action    Target Date for completion: 04/10/2024     Anticipated therapeutic modalities:mindfulness and CBT     People identified to complete this goal:myself with the help of my therapist.       Objective 1: (identify the means of measuring success in meeting the objective): If and when my symptoms arise they will be at a minimal level. Objective 2: (identify the means of measuring success in meeting the objective): n/a      Long Term Goal 2 (in the client's own words): I need to improve my motivation, focus and better manage my symptoms of ADHD    Stage of Change: Action    Target Date for completion: 04/10/2024     Anticipated therapeutic modalities: Mindfulness, CBT and behavioral strategies     People identified to complete this goal: myself with the help of my therapist      Objective 1: (identify the means of measuring success in meeting the objective): I will do more and I will stick with it and be able to maintain my focus      Objective 2: (identify the means of measuring success in meeting the objective): I will use the therapeutic modalities along with medication compliance.       Long Term Goal 3 (in the client's own words):     Stage of Change:     Target Date for completion:      Anticipated therapeutic modalities:      People identified to complete this goal:       Objective 1: (identify the means of measuring success in meeting the objective):       Objective 2: (identify the means of measuring success in meeting the objective): I am currently under the care of a [facility] psychiatric provider: yes    My [facility] psychiatric provider is: Maxwell Tierney    I am currently taking psychiatric medications: Yes, as prescribed    I feel that I will be ready for discharge from mental health care when I reach the following (measurable goal/objective): when I have made the progress on my goals that I am comfortable with. For children and adults who have a legal guardian:   Has there been any change to custody orders and/or guardianship status? NA. If yes, attach updated documentation. I have created my Crisis Plan and have been offered a copy of this plan    [address]: Diagnosis and Treatment Plan explained to Demario Streeter acknowledges an understanding of their diagnosis. Ramirez Christopher agrees to this treatment plan.     I have been offered a copy of this Treatment Plan. yes

## 2023-10-30 DIAGNOSIS — M25.551 RIGHT HIP PAIN: Primary | ICD-10-CM

## 2023-11-01 ENCOUNTER — TELEPHONE (OUTPATIENT)
Dept: PSYCHIATRY | Facility: CLINIC | Age: 43
End: 2023-11-01

## 2023-11-02 ENCOUNTER — TELEPHONE (OUTPATIENT)
Dept: PSYCHIATRY | Facility: CLINIC | Age: 43
End: 2023-11-02

## 2023-11-02 ENCOUNTER — APPOINTMENT (OUTPATIENT)
Dept: RADIOLOGY | Facility: MEDICAL CENTER | Age: 43
End: 2023-11-02
Payer: COMMERCIAL

## 2023-11-02 ENCOUNTER — OFFICE VISIT (OUTPATIENT)
Dept: OBGYN CLINIC | Facility: MEDICAL CENTER | Age: 43
End: 2023-11-02
Payer: COMMERCIAL

## 2023-11-02 VITALS
SYSTOLIC BLOOD PRESSURE: 116 MMHG | DIASTOLIC BLOOD PRESSURE: 78 MMHG | HEART RATE: 90 BPM | WEIGHT: 179 LBS | BODY MASS INDEX: 23.72 KG/M2 | HEIGHT: 73 IN

## 2023-11-02 DIAGNOSIS — M25.551 RIGHT HIP PAIN: ICD-10-CM

## 2023-11-02 DIAGNOSIS — M16.11 PRIMARY OSTEOARTHRITIS OF ONE HIP, RIGHT: Primary | ICD-10-CM

## 2023-11-02 DIAGNOSIS — M25.551 CHRONIC HIP PAIN, BILATERAL: ICD-10-CM

## 2023-11-02 DIAGNOSIS — M54.41 CHRONIC BILATERAL LOW BACK PAIN WITH RIGHT-SIDED SCIATICA: ICD-10-CM

## 2023-11-02 DIAGNOSIS — G89.29 CHRONIC BILATERAL LOW BACK PAIN WITH RIGHT-SIDED SCIATICA: ICD-10-CM

## 2023-11-02 DIAGNOSIS — G89.29 CHRONIC HIP PAIN, BILATERAL: ICD-10-CM

## 2023-11-02 DIAGNOSIS — M25.552 CHRONIC HIP PAIN, BILATERAL: ICD-10-CM

## 2023-11-02 PROCEDURE — 73502 X-RAY EXAM HIP UNI 2-3 VIEWS: CPT

## 2023-11-02 PROCEDURE — 99204 OFFICE O/P NEW MOD 45 MIN: CPT | Performed by: EMERGENCY MEDICINE

## 2023-11-02 NOTE — TELEPHONE ENCOUNTER
Writer SULY to offer appointment for [email or web address] pm. Provided office number if able to schedule. Please assist patient upon return call.
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
## 2023-11-02 NOTE — PATIENT INSTRUCTIONS
You may use Advil (ibuprofen) 400-600mg every 6 hours or at least twice per day OR Aleve (naproxen) 250-500mg every 12 hours as needed for pain and inflammation. You may also take Tylenol 500mg every 4-6 hours as needed OR max 1,000mg per dose up to 3 times per day for a total of 3,000mg per day  Check with your primary care physician to see if these medications are safe to take and to make sure they do not interfere with your other medications and medical issues.

## 2023-11-15 ENCOUNTER — OFFICE VISIT (OUTPATIENT)
Dept: PSYCHIATRY | Facility: CLINIC | Age: 43
End: 2023-11-15
Payer: COMMERCIAL

## 2023-11-15 ENCOUNTER — TELEPHONE (OUTPATIENT)
Dept: PSYCHIATRY | Facility: CLINIC | Age: 43
End: 2023-11-15

## 2023-11-15 DIAGNOSIS — F90.0 ATTENTION DEFICIT HYPERACTIVITY DISORDER (ADHD), PREDOMINANTLY INATTENTIVE TYPE: Primary | ICD-10-CM

## 2023-11-15 PROCEDURE — 99214 OFFICE O/P EST MOD 30 MIN: CPT | Performed by: NURSE PRACTITIONER

## 2023-11-15 PROCEDURE — 90833 PSYTX W PT W E/M 30 MIN: CPT | Performed by: NURSE PRACTITIONER

## 2023-11-15 RX ORDER — DEXTROAMPHETAMINE SACCHARATE, AMPHETAMINE ASPARTATE MONOHYDRATE, DEXTROAMPHETAMINE SULFATE AND AMPHETAMINE SULFATE 6.25; 6.25; 6.25; 6.25 MG/1; MG/1; MG/1; MG/1
25 CAPSULE, EXTENDED RELEASE ORAL EVERY MORNING
Qty: 30 CAPSULE | Refills: 0 | Status: SHIPPED | OUTPATIENT
Start: 2023-11-15

## 2023-11-15 RX ORDER — DEXTROAMPHETAMINE SACCHARATE, AMPHETAMINE ASPARTATE, DEXTROAMPHETAMINE SULFATE AND AMPHETAMINE SULFATE 1.25; 1.25; 1.25; 1.25 MG/1; MG/1; MG/1; MG/1
5 TABLET ORAL DAILY
Qty: 30 TABLET | Refills: 0 | Status: SHIPPED | OUTPATIENT
Start: 2023-11-15
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
## 2023-11-15 NOTE — TELEPHONE ENCOUNTER
Writer SULY to inform patient their 11/28 apt has been cancelled and provided office to call back if requesting to be placed on providers cancellation list. Patients next apt is [email or web address]. Please assist upon return call.  TY

## 2023-12-14 ENCOUNTER — OFFICE VISIT (OUTPATIENT)
Dept: OBGYN CLINIC | Facility: MEDICAL CENTER | Age: 43
End: 2023-12-14
Payer: COMMERCIAL

## 2023-12-14 ENCOUNTER — APPOINTMENT (OUTPATIENT)
Dept: RADIOLOGY | Facility: MEDICAL CENTER | Age: 43
End: 2023-12-14
Payer: COMMERCIAL

## 2023-12-14 VITALS
DIASTOLIC BLOOD PRESSURE: 78 MMHG | HEART RATE: 90 BPM | BODY MASS INDEX: 23.72 KG/M2 | SYSTOLIC BLOOD PRESSURE: 118 MMHG | WEIGHT: 179 LBS | HEIGHT: 73 IN

## 2023-12-14 DIAGNOSIS — M54.41 CHRONIC BILATERAL LOW BACK PAIN WITH RIGHT-SIDED SCIATICA: ICD-10-CM

## 2023-12-14 DIAGNOSIS — M25.552 CHRONIC HIP PAIN, BILATERAL: ICD-10-CM

## 2023-12-14 DIAGNOSIS — G89.29 CHRONIC BILATERAL LOW BACK PAIN WITH RIGHT-SIDED SCIATICA: Primary | ICD-10-CM

## 2023-12-14 DIAGNOSIS — M25.512 LEFT SHOULDER PAIN, UNSPECIFIED CHRONICITY: ICD-10-CM

## 2023-12-14 DIAGNOSIS — M25.551 CHRONIC HIP PAIN, BILATERAL: ICD-10-CM

## 2023-12-14 DIAGNOSIS — M54.41 CHRONIC BILATERAL LOW BACK PAIN WITH RIGHT-SIDED SCIATICA: Primary | ICD-10-CM

## 2023-12-14 DIAGNOSIS — M16.11 PRIMARY OSTEOARTHRITIS OF ONE HIP, RIGHT: ICD-10-CM

## 2023-12-14 DIAGNOSIS — M51.36 LUMBAR DEGENERATIVE DISC DISEASE: ICD-10-CM

## 2023-12-14 DIAGNOSIS — G89.29 CHRONIC HIP PAIN, BILATERAL: ICD-10-CM

## 2023-12-14 DIAGNOSIS — G89.29 CHRONIC BILATERAL LOW BACK PAIN WITH RIGHT-SIDED SCIATICA: ICD-10-CM

## 2023-12-14 PROCEDURE — 99214 OFFICE O/P EST MOD 30 MIN: CPT | Performed by: EMERGENCY MEDICINE

## 2023-12-14 PROCEDURE — 72100 X-RAY EXAM L-S SPINE 2/3 VWS: CPT

## 2023-12-14 RX ORDER — METHYLPREDNISOLONE 4 MG/1
TABLET ORAL
Qty: 1 EACH | Refills: 0 | Status: SHIPPED | OUTPATIENT
Start: 2023-12-14

## 2023-12-14 NOTE — PATIENT INSTRUCTIONS
While taking the oral steroid Medrol Dose Pack, do not take any NSAIDs such as Advil, Motrin, ibuprofen, Motrin, Aleve, naproxen, Celebrex or Meloxicam.  You can restart the NSAIDs after you finish the steroids.    However you may take Tylenol 500mg every 4-6 hours as needed OR max 1,000mg per dose up to 3 times per day for a total of 3,000mg per day  While taking oral steroids, you may experience mild side effects such as feeling jittery or flushing.  Please call if your side effects are significant or you have any questions.
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
## 2023-12-21 ENCOUNTER — SOCIAL WORK (OUTPATIENT)
Dept: BEHAVIORAL/MENTAL HEALTH CLINIC | Facility: CLINIC | Age: 43
End: 2023-12-21

## 2023-12-21 ENCOUNTER — TELEPHONE (OUTPATIENT)
Dept: PSYCHIATRY | Facility: CLINIC | Age: 43
End: 2023-12-21

## 2023-12-21 DIAGNOSIS — F33.2 SEVERE EPISODE OF RECURRENT MAJOR DEPRESSIVE DISORDER, WITHOUT PSYCHOTIC FEATURES (HCC): ICD-10-CM

## 2023-12-21 DIAGNOSIS — F41.0 PANIC ATTACKS: ICD-10-CM

## 2023-12-21 DIAGNOSIS — F90.0 ADHD, PREDOMINANTLY INATTENTIVE TYPE: Primary | ICD-10-CM

## 2023-12-21 DIAGNOSIS — F40.10 SOCIAL ANXIETY DISORDER: ICD-10-CM

## 2023-12-21 DIAGNOSIS — F43.10 PTSD (POST-TRAUMATIC STRESS DISORDER): ICD-10-CM

## 2023-12-21 PROCEDURE — NOSHOW: Performed by: SOCIAL WORKER

## 2023-12-21 NOTE — PSYCH
No Call. No Show. Charge    Gwen Wright no showed 12/21/23 appointment , staff called and left message to reschedule appointment     Treatment Plan not due at this session. The patient was previously discharged due to no shows and somehow he got put back in my schedule and he has started no showing again. I am discharging him at this juncture and another discharge summary will be done. Dejan Armstrong

## 2023-12-21 NOTE — PSYCH
Psychotherapy Discharge Summary    Preferred Name: Gwen Wright  YOB: 1980    Admission date to psychotherapy: 08/19/2022    Referred by: self and provider    Presenting Problem: He wanted help with managing his depression, his poor motivation, poor focus, symptoms of ADHD, and relationship issues. I did a discharge summary on 5/30/2023 due to multiple no shows and somehow he got back in my schedule. He now started no showing again so the undersigned is discharging at this point. Dejan Armstrong    Course of treatment included : individual therapy     Progress/Outcome of Treatment Goals (brief summary of course of treatment) Patient only came a handful of sessions. We discussed the issues concerning to him but is investment in treatment was sporadic and inconsistent to have made any real gains.     Treatment Complications (if any): no shows    Treatment Progress: He discussed concerns but progress is difficult to ascertain due to sporadic attendance.     Current [facility]iatric Provider: Summer Baird    Discharge Medications include: As per KEIKO Baird    Discharge Date: 12/21/2023    Discharge Diagnosis:   1. ADHD, predominantly inattentive type        2. Panic attacks        3. Severe episode of recurrent major depressive disorder, without psychotic features (HCC)        4. PTSD (post-traumatic stress disorder)        5. Social anxiety disorder            Criteria for Discharge: demonstrated failure to uphold their treatment plan/contract    Aftercare recommendations include (include specific referral names and phone numbers, if appropriate): n/a    Prognosis: difficult to ascertain due to sporadic attendance.

## 2023-12-22 ENCOUNTER — TELEPHONE (OUTPATIENT)
Dept: PSYCHIATRY | Facility: CLINIC | Age: 43
End: 2023-12-22

## 2023-12-22 NOTE — TELEPHONE ENCOUNTER
NO-SHOW LETTER MAILED TO Gwen Wright.  ADDRESS: [address] [place] [phone]

## 2024-01-02 DIAGNOSIS — E55.9 VITAMIN D DEFICIENCY: ICD-10-CM

## 2024-01-02 RX ORDER — ERGOCALCIFEROL 1.25 MG/1
50000 CAPSULE ORAL WEEKLY
Qty: 12 CAPSULE | Refills: 1 | Status: SHIPPED | OUTPATIENT
Start: 2024-01-02

## 2024-01-10 DIAGNOSIS — F90.0 ATTENTION DEFICIT HYPERACTIVITY DISORDER (ADHD), PREDOMINANTLY INATTENTIVE TYPE: ICD-10-CM

## 2024-01-10 NOTE — TELEPHONE ENCOUNTER
Medication Refill Request     Name of Medication Adderall  Dose/Frequency 25mg  Quantity 30  Verified pharmacy   [x]  Verified ordering Provider   [x]  Does patient have enough for the next 3 days? Yes [x] No []  Does patient have a follow-up appointment scheduled? Yes [x] No []   If so when is appointment: 02/22/24 2pm

## 2024-01-10 NOTE — TELEPHONE ENCOUNTER
Received a message from [employer] via Patient Message 11/13/23, requesting a refill of Adderall.

## 2024-01-11 RX ORDER — DEXTROAMPHETAMINE SACCHARATE, AMPHETAMINE ASPARTATE MONOHYDRATE, DEXTROAMPHETAMINE SULFATE AND AMPHETAMINE SULFATE 6.25; 6.25; 6.25; 6.25 MG/1; MG/1; MG/1; MG/1
25 CAPSULE, EXTENDED RELEASE ORAL EVERY MORNING
Qty: 30 CAPSULE | Refills: 0 | Status: SHIPPED | OUTPATIENT
Start: 2024-01-11 | End: 2024-01-12 | Stop reason: ALTCHOICE

## 2024-01-11 NOTE — TELEPHONE ENCOUNTER
PDMP website reviewed. Gwen has been appropriately adherent to controlled psychotropic medications without evidence of abuse or misuse. As such, will send 30-day refill to pharmacy of choice and follow up as necessary.

## 2024-01-11 NOTE — TELEPHONE ENCOUNTER
Patient called office in regards to seeing if medication was sent to pharmacy. Writer informed patient the message was sent to provider.

## 2024-01-12 DIAGNOSIS — F90.2 ATTENTION DEFICIT HYPERACTIVITY DISORDER (ADHD), COMBINED TYPE: Primary | ICD-10-CM

## 2024-01-12 RX ORDER — DEXTROAMPHETAMINE SACCHARATE, AMPHETAMINE ASPARTATE MONOHYDRATE, DEXTROAMPHETAMINE SULFATE AND AMPHETAMINE SULFATE 5; 5; 5; 5 MG/1; MG/1; MG/1; MG/1
20 CAPSULE, EXTENDED RELEASE ORAL EVERY MORNING
Qty: 30 CAPSULE | Refills: 0 | Status: SHIPPED | OUTPATIENT
Start: 2024-01-12

## 2024-01-12 RX ORDER — DEXTROAMPHETAMINE SACCHARATE, AMPHETAMINE ASPARTATE MONOHYDRATE, DEXTROAMPHETAMINE SULFATE AND AMPHETAMINE SULFATE 1.25; 1.25; 1.25; 1.25 MG/1; MG/1; MG/1; MG/1
5 CAPSULE, EXTENDED RELEASE ORAL EVERY MORNING
Qty: 30 CAPSULE | Refills: 0 | Status: SHIPPED | OUTPATIENT
Start: 2024-01-12

## 2024-01-12 NOTE — TELEPHONE ENCOUNTER
Patient called the office and left a voicemail needing his prescription for the Adderall sent to a different pharmacy. The previous one did not have it. [facility] [address] [phone]  has the ADDERALL but only 20 mg not the 25mg. He's asking that his prescription be adjusted and sent to [facility].

## 2024-01-12 NOTE — TELEPHONE ENCOUNTER
Rx sent to pharmacy requested for Adderall XR 20 mg daily to be taken with Adderall XR 5 mg daily.  Both Rx for 30 days no refills.  Please advise patient.
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
## 2024-02-22 ENCOUNTER — OFFICE VISIT (OUTPATIENT)
Dept: PSYCHIATRY | Facility: CLINIC | Age: 44
End: 2024-02-22

## 2024-02-22 ENCOUNTER — TELEPHONE (OUTPATIENT)
Dept: PSYCHIATRY | Facility: CLINIC | Age: 44
End: 2024-02-22

## 2024-02-22 DIAGNOSIS — F90.0 ATTENTION DEFICIT HYPERACTIVITY DISORDER (ADHD), PREDOMINANTLY INATTENTIVE TYPE: ICD-10-CM

## 2024-02-22 DIAGNOSIS — F41.9 ANXIETY: Primary | ICD-10-CM

## 2024-02-22 RX ORDER — DEXTROAMPHETAMINE SACCHARATE, AMPHETAMINE ASPARTATE MONOHYDRATE, DEXTROAMPHETAMINE SULFATE AND AMPHETAMINE SULFATE 6.25; 6.25; 6.25; 6.25 MG/1; MG/1; MG/1; MG/1
25 CAPSULE, EXTENDED RELEASE ORAL EVERY MORNING
Qty: 30 CAPSULE | Refills: 0 | Status: SHIPPED | OUTPATIENT
Start: 2024-02-22

## 2024-02-22 RX ORDER — DEXTROAMPHETAMINE SACCHARATE, AMPHETAMINE ASPARTATE, DEXTROAMPHETAMINE SULFATE AND AMPHETAMINE SULFATE 1.25; 1.25; 1.25; 1.25 MG/1; MG/1; MG/1; MG/1
5 TABLET ORAL DAILY
Qty: 30 TABLET | Refills: 0 | Status: SHIPPED | OUTPATIENT
Start: 2024-02-22

## 2024-02-22 RX ORDER — ESCITALOPRAM OXALATE 10 MG/1
5 TABLET ORAL DAILY
Qty: 30 TABLET | Refills: 2 | Status: SHIPPED | OUTPATIENT
Start: 2024-02-22 | End: 2024-02-29

## 2024-02-23 ENCOUNTER — TELEPHONE (OUTPATIENT)
Dept: PSYCHIATRY | Facility: CLINIC | Age: 44
End: 2024-02-23

## 2024-02-23 NOTE — TELEPHONE ENCOUNTER
Contacted patient in regards to [employer] where patient request to be r/s with Zohaib Armstrong in attempts to notify patient they would be added to wait list due to provider wide case load

## 2024-04-04 ENCOUNTER — TELEPHONE (OUTPATIENT)
Dept: PSYCHIATRY | Facility: CLINIC | Age: 44
End: 2024-04-04

## 2024-04-04 NOTE — TELEPHONE ENCOUNTER
Patient is calling regarding cancelling an appointment.    Date/Time: 4/4/24 at 3pm    Reason: Patient left VM that he is sick and would like to be r/s for next week around the same time.     Patient was rescheduled: YES [x] NO []  If yes, when was Patient reschedule for: 4/12/24 3:30pm    Patient requesting call back to reschedule: YES [] NO [x]

## 2024-04-10 ENCOUNTER — TELEPHONE (OUTPATIENT)
Dept: PSYCHIATRY | Facility: CLINIC | Age: 44
End: 2024-04-10

## 2024-04-10 NOTE — TELEPHONE ENCOUNTER
Patient is calling regarding cancelling an appointment.    Date/Time: 24 at 3:30pm    Reason: Patient has family emergency,  to attend.     Patient was rescheduled: YES [x] NO []  If yes, when was Patient reschedule for: 24 at 2pm    Patient requesting call back to reschedule: YES [] NO [x]

## 2024-04-17 ENCOUNTER — OFFICE VISIT (OUTPATIENT)
Dept: PSYCHIATRY | Facility: CLINIC | Age: 44
End: 2024-04-17

## 2024-04-17 DIAGNOSIS — F90.0 ATTENTION DEFICIT HYPERACTIVITY DISORDER (ADHD), PREDOMINANTLY INATTENTIVE TYPE: Primary | ICD-10-CM

## 2024-04-17 DIAGNOSIS — F41.9 ANXIETY: ICD-10-CM

## 2024-04-17 PROBLEM — F33.42 RECURRENT MAJOR DEPRESSIVE DISORDER, IN FULL REMISSION (HCC): Status: ACTIVE | Noted: 2023-07-26

## 2024-04-17 RX ORDER — DEXTROAMPHETAMINE SACCHARATE, AMPHETAMINE ASPARTATE, DEXTROAMPHETAMINE SULFATE AND AMPHETAMINE SULFATE 1.25; 1.25; 1.25; 1.25 MG/1; MG/1; MG/1; MG/1
5 TABLET ORAL DAILY
Qty: 30 TABLET | Refills: 0 | Status: SHIPPED | OUTPATIENT
Start: 2024-05-17

## 2024-04-17 RX ORDER — DEXTROAMPHETAMINE SACCHARATE, AMPHETAMINE ASPARTATE MONOHYDRATE, DEXTROAMPHETAMINE SULFATE AND AMPHETAMINE SULFATE 6.25; 6.25; 6.25; 6.25 MG/1; MG/1; MG/1; MG/1
25 CAPSULE, EXTENDED RELEASE ORAL EVERY MORNING
Qty: 30 CAPSULE | Refills: 0 | Status: SHIPPED | OUTPATIENT
Start: 2024-04-17

## 2024-04-17 RX ORDER — DEXTROAMPHETAMINE SACCHARATE, AMPHETAMINE ASPARTATE, DEXTROAMPHETAMINE SULFATE AND AMPHETAMINE SULFATE 1.25; 1.25; 1.25; 1.25 MG/1; MG/1; MG/1; MG/1
5 TABLET ORAL DAILY
Qty: 30 TABLET | Refills: 0 | Status: SHIPPED | OUTPATIENT
Start: 2024-04-17

## 2024-04-17 RX ORDER — DEXTROAMPHETAMINE SACCHARATE, AMPHETAMINE ASPARTATE, DEXTROAMPHETAMINE SULFATE AND AMPHETAMINE SULFATE 1.25; 1.25; 1.25; 1.25 MG/1; MG/1; MG/1; MG/1
5 TABLET ORAL DAILY
Qty: 30 TABLET | Refills: 0 | Status: SHIPPED | OUTPATIENT
Start: 2024-06-16

## 2024-04-17 RX ORDER — DEXTROAMPHETAMINE SACCHARATE, AMPHETAMINE ASPARTATE MONOHYDRATE, DEXTROAMPHETAMINE SULFATE AND AMPHETAMINE SULFATE 6.25; 6.25; 6.25; 6.25 MG/1; MG/1; MG/1; MG/1
25 CAPSULE, EXTENDED RELEASE ORAL EVERY MORNING
Qty: 30 CAPSULE | Refills: 0 | Status: SHIPPED | OUTPATIENT
Start: 2024-06-16

## 2024-04-17 RX ORDER — DEXTROAMPHETAMINE SACCHARATE, AMPHETAMINE ASPARTATE MONOHYDRATE, DEXTROAMPHETAMINE SULFATE AND AMPHETAMINE SULFATE 6.25; 6.25; 6.25; 6.25 MG/1; MG/1; MG/1; MG/1
25 CAPSULE, EXTENDED RELEASE ORAL EVERY MORNING
Qty: 30 CAPSULE | Refills: 0 | Status: SHIPPED | OUTPATIENT
Start: 2024-05-17
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
## 2024-06-07 DIAGNOSIS — F90.0 ATTENTION DEFICIT HYPERACTIVITY DISORDER (ADHD), PREDOMINANTLY INATTENTIVE TYPE: ICD-10-CM

## 2024-06-07 RX ORDER — DEXTROAMPHETAMINE SACCHARATE, AMPHETAMINE ASPARTATE MONOHYDRATE, DEXTROAMPHETAMINE SULFATE AND AMPHETAMINE SULFATE 6.25; 6.25; 6.25; 6.25 MG/1; MG/1; MG/1; MG/1
25 CAPSULE, EXTENDED RELEASE ORAL EVERY MORNING
Qty: 30 CAPSULE | Refills: 0 | Status: SHIPPED | OUTPATIENT
Start: 2024-06-07

## 2024-06-07 RX ORDER — DEXTROAMPHETAMINE SACCHARATE, AMPHETAMINE ASPARTATE, DEXTROAMPHETAMINE SULFATE AND AMPHETAMINE SULFATE 1.25; 1.25; 1.25; 1.25 MG/1; MG/1; MG/1; MG/1
5 TABLET ORAL DAILY
Qty: 30 TABLET | Refills: 0 | Status: SHIPPED | OUTPATIENT
Start: 2024-06-07

## 2024-06-07 NOTE — TELEPHONE ENCOUNTER
Medication Refill Request     Name of Medication ADDERALL XR  Dose/Frequency 25 mg/ Take 1 capsule by mouth every morning  Quantity 30  Verified pharmacy   [x]  Verified ordering Provider   [x]  Does patient have enough for the next 3 days? Yes [] No [x]  Does patient have a follow-up appointment scheduled? Yes [x] No []   If so when is appointment: 7/18/2024    Medication Refill Request     Name of Medication ADDERALL  Dose/Frequency 5 mg/ Take 1 tablet by mouth daily.  Quantity 30  Verified pharmacy   [x]  Verified ordering Provider   [x]  Does patient have enough for the next 3 days? Yes [x] No []  Does patient have a follow-up appointment scheduled? Yes [x] No []   If so when is appointment: 7/18/2024
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
## 2024-07-18 ENCOUNTER — TELEMEDICINE (OUTPATIENT)
Dept: PSYCHIATRY | Facility: CLINIC | Age: 44
End: 2024-07-18

## 2024-07-18 DIAGNOSIS — F90.0 ATTENTION DEFICIT HYPERACTIVITY DISORDER (ADHD), PREDOMINANTLY INATTENTIVE TYPE: ICD-10-CM

## 2024-07-18 DIAGNOSIS — F90.0 ATTENTION DEFICIT HYPERACTIVITY DISORDER (ADHD), PREDOMINANTLY INATTENTIVE TYPE: Primary | ICD-10-CM

## 2024-07-18 DIAGNOSIS — F41.9 ANXIETY: ICD-10-CM

## 2024-07-18 DIAGNOSIS — F32.9 REACTIVE DEPRESSION (SITUATIONAL): ICD-10-CM

## 2024-07-18 PROCEDURE — 99213 OFFICE O/P EST LOW 20 MIN: CPT | Performed by: NURSE PRACTITIONER

## 2024-07-18 RX ORDER — DEXTROAMPHETAMINE SACCHARATE, AMPHETAMINE ASPARTATE MONOHYDRATE, DEXTROAMPHETAMINE SULFATE AND AMPHETAMINE SULFATE 6.25; 6.25; 6.25; 6.25 MG/1; MG/1; MG/1; MG/1
25 CAPSULE, EXTENDED RELEASE ORAL EVERY MORNING
Qty: 30 CAPSULE | Refills: 0 | Status: SHIPPED | OUTPATIENT
Start: 2024-07-18

## 2024-07-18 RX ORDER — DEXTROAMPHETAMINE SACCHARATE, AMPHETAMINE ASPARTATE, DEXTROAMPHETAMINE SULFATE AND AMPHETAMINE SULFATE 1.25; 1.25; 1.25; 1.25 MG/1; MG/1; MG/1; MG/1
5 TABLET ORAL DAILY
Qty: 30 TABLET | Refills: 0 | Status: SHIPPED | OUTPATIENT
Start: 2024-07-18

## 2024-08-27 ENCOUNTER — TELEPHONE (OUTPATIENT)
Age: 44
End: 2024-08-27
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
## 2024-10-13 NOTE — ASSESSMENT & PLAN NOTE
Treatment status: Not at goal, worsening  Considerations: Driven by psychosocial stressors, financial hardship  Plan: Start Lexapro optimizing to 10 mg daily in 1 week  Orders:    escitalopram (LEXAPRO) 5 mg tablet; Take 1 tablet (5 mg total) by mouth daily Take half tablet daily for 1 week, then take full tablet daily.

## 2024-10-13 NOTE — ASSESSMENT & PLAN NOTE
Treatment status: Not at goal, worsening  Considerations: Nonadherent to medications due to financial constraints  Plan: Continue Adderall XR 25 mg daily, Adderall IR 5 mg daily    Orders:    amphetamine-dextroamphetamine (ADDERALL XR, 25MG,) 25 MG 24 hr capsule; Take 1 capsule (25 mg total) by mouth every morning Max Daily Amount: 25 mg    amphetamine-dextroamphetamine (ADDERALL, 5MG,) 5 MG tablet; Take 1 tablet (5 mg total) by mouth daily Max Daily Amount: 5 mg

## 2024-10-14 ENCOUNTER — TELEMEDICINE (OUTPATIENT)
Dept: PSYCHIATRY | Facility: CLINIC | Age: 44
End: 2024-10-14

## 2024-10-14 DIAGNOSIS — F41.9 ANXIETY: ICD-10-CM

## 2024-10-14 DIAGNOSIS — F90.0 ATTENTION DEFICIT HYPERACTIVITY DISORDER (ADHD), PREDOMINANTLY INATTENTIVE TYPE: Primary | ICD-10-CM

## 2024-10-14 DIAGNOSIS — F32.9 REACTIVE DEPRESSION (SITUATIONAL): ICD-10-CM

## 2024-10-14 PROCEDURE — 99214 OFFICE O/P EST MOD 30 MIN: CPT | Performed by: NURSE PRACTITIONER

## 2024-10-14 RX ORDER — DEXTROAMPHETAMINE SACCHARATE, AMPHETAMINE ASPARTATE MONOHYDRATE, DEXTROAMPHETAMINE SULFATE AND AMPHETAMINE SULFATE 6.25; 6.25; 6.25; 6.25 MG/1; MG/1; MG/1; MG/1
25 CAPSULE, EXTENDED RELEASE ORAL EVERY MORNING
Qty: 30 CAPSULE | Refills: 0 | Status: SHIPPED | OUTPATIENT
Start: 2024-10-14

## 2024-10-14 RX ORDER — DEXTROAMPHETAMINE SACCHARATE, AMPHETAMINE ASPARTATE, DEXTROAMPHETAMINE SULFATE AND AMPHETAMINE SULFATE 1.25; 1.25; 1.25; 1.25 MG/1; MG/1; MG/1; MG/1
5 TABLET ORAL DAILY
Qty: 30 TABLET | Refills: 0 | Status: SHIPPED | OUTPATIENT
Start: 2024-10-14

## 2024-10-14 RX ORDER — ESCITALOPRAM OXALATE 5 MG/1
5 TABLET ORAL DAILY
Qty: 30 TABLET | Refills: 1 | Status: SHIPPED | OUTPATIENT
Start: 2024-10-14

## 2024-10-14 NOTE — BH TREATMENT PLAN
TREATMENT PLAN (Medication Management Only)        [facility]    Name and Date of Birth:  Gwen Wright 44 y.o. 1980  Date of Treatment Plan: October 14, 2024  Diagnosis/Diagnoses:    1. Attention deficit hyperactivity disorder (ADHD), predominantly inattentive type    2. Reactive depression (situational)    3. Anxiety      Strengths/Personal Resources for Self-Care: supportive family, supportive friends, average or above intelligence, family ties, good physical health, good understanding of illness, independence, motivation for treatment, ability to negotiate basic needs, being resoureceful, self-reliance, willingness to work on problems, work skills.  Area/Areas of need (in own words): depression  1. Long Term Goal: alleviate depression.  Target Date:6 months - 4/14/2025  Person/Persons responsible for completion of goal: Gwen  2.  Short Term Objective (s) - How will we reach this goal?:   A. Provider new recommended medication/dosage changes and/or continue medication(s): start Lexapro.  B. Take psychiatric medications responsibly.  C. Call supportive people when needed .  Target Date:6 months - 4/14/2025  Person/Persons Responsible for Completion of Goal: Gwen  Progress Towards Goals: minimal progress  Treatment Modality: medication management every 3 months  Review due 180 days from date of this plan: 6 months - 4/14/2025  Expected length of service: ongoing treatment  My Physician/PA/NP and I have developed this plan together and I agree to work on the goals and objectives. I understand the treatment goals that were developed for my treatment.

## 2024-10-15 ENCOUNTER — TELEPHONE (OUTPATIENT)
Dept: PSYCHIATRY | Facility: CLINIC | Age: 44
End: 2024-10-15

## 2024-10-15 NOTE — TELEPHONE ENCOUNTER
----- Message from LACI Ferguson sent at 10/14/2024  6:23 PM EDT -----  Regarding: Referral for services  I am unsure if this patient qualifies for any services, but he has been unemployed for several months, collecting unemployment, but endorsing significant financial strain.  He is even more concerned because the winter is coming and now he will have a high electric bill in addition to an oil payment.  He is very depressed, tearful throughout our encounters, no longer has health insurance, and is paying out of pocket for medication management appointments and medications.  I am doing everything I can to help him by calling pharmacies and looking for the cheapest prices, but I would love to see if there is anything else we can do.  Food stamps, assistance with utilities, anything really.  He is very proud, but does feel that he has reached a point that he needs assistance.  I let him know I had be reaching out to case management.  Any chance either 1 of you has any ideas for this patient?  Would you mind reaching out to him?    Thank you,  Summer Baird

## 2024-10-17 ENCOUNTER — TELEPHONE (OUTPATIENT)
Age: 44
End: 2024-10-17

## 2024-10-17 NOTE — TELEPHONE ENCOUNTER
Contacted patient for Talk Therapy  to verify needs of services in attempts to offer patient an appointment at Available Office. Writer verified Full Name and . Writer spoke with pt who stated receiving services elsewhere.    1st call attempt, pt removed from wait list

## 2024-10-18 NOTE — TELEPHONE ENCOUNTER
Outreached patient via phone at [phone]. The patient was not available and a voicemail was left with callback instructions for this writer at [phone].

## 2024-11-12 DIAGNOSIS — F90.0 ATTENTION DEFICIT HYPERACTIVITY DISORDER (ADHD), PREDOMINANTLY INATTENTIVE TYPE: ICD-10-CM

## 2024-11-12 RX ORDER — DEXTROAMPHETAMINE SACCHARATE, AMPHETAMINE ASPARTATE, DEXTROAMPHETAMINE SULFATE AND AMPHETAMINE SULFATE 1.25; 1.25; 1.25; 1.25 MG/1; MG/1; MG/1; MG/1
5 TABLET ORAL DAILY
Qty: 30 TABLET | Refills: 0 | Status: SHIPPED | OUTPATIENT
Start: 2024-11-12 | End: 2024-11-18 | Stop reason: SDUPTHER

## 2024-11-12 RX ORDER — DEXTROAMPHETAMINE SACCHARATE, AMPHETAMINE ASPARTATE MONOHYDRATE, DEXTROAMPHETAMINE SULFATE AND AMPHETAMINE SULFATE 6.25; 6.25; 6.25; 6.25 MG/1; MG/1; MG/1; MG/1
25 CAPSULE, EXTENDED RELEASE ORAL EVERY MORNING
Qty: 30 CAPSULE | Refills: 0 | Status: SHIPPED | OUTPATIENT
Start: 2024-11-12 | End: 2024-11-18 | Stop reason: SDUPTHER

## 2024-11-18 DIAGNOSIS — F90.0 ATTENTION DEFICIT HYPERACTIVITY DISORDER (ADHD), PREDOMINANTLY INATTENTIVE TYPE: ICD-10-CM

## 2024-11-18 RX ORDER — DEXTROAMPHETAMINE SACCHARATE, AMPHETAMINE ASPARTATE MONOHYDRATE, DEXTROAMPHETAMINE SULFATE AND AMPHETAMINE SULFATE 6.25; 6.25; 6.25; 6.25 MG/1; MG/1; MG/1; MG/1
25 CAPSULE, EXTENDED RELEASE ORAL EVERY MORNING
Qty: 30 CAPSULE | Refills: 0 | Status: SHIPPED | OUTPATIENT
Start: 2024-11-18

## 2024-11-18 RX ORDER — DEXTROAMPHETAMINE SACCHARATE, AMPHETAMINE ASPARTATE, DEXTROAMPHETAMINE SULFATE AND AMPHETAMINE SULFATE 1.25; 1.25; 1.25; 1.25 MG/1; MG/1; MG/1; MG/1
5 TABLET ORAL DAILY
Qty: 30 TABLET | Refills: 0 | Status: SHIPPED | OUTPATIENT
Start: 2024-11-18

## 2024-11-18 NOTE — TELEPHONE ENCOUNTER
Reason for call:   [x] Refill   [] Prior Auth  [x] Other: NOT A DUP! SENT TO THE WRONG PHARMACY    Office:   [] PCP/Provider -   [x] Specialty/Provider - [facility]/ LACI Baird    Medication: amphetamine-dextroamphetamine (ADDERALL XR, 25MG,) 25 MG 24 hr capsule     Dose/Frequency: Take 1 capsule (25 mg total) by mouth every morning Max Daily Amount: 25 mg    Quantity: 30    Medication: amphetamine-dextroamphetamine (ADDERALL, 5MG,) 5 MG tablet     Dose/Frequency: Take 1 tablet (5 mg total) by mouth daily Max Daily Amount: 5 mg    Quantity: 30    Pharmacy: [id number]  [place] - [id number] -[phone]    Does the patient have enough for 3 days?   [] Yes   [x] No - Send as HP to POD

## 2024-11-18 NOTE — TELEPHONE ENCOUNTER
Medication:  PDMP  10/14/2024 10/14/2024 Amphetamine Salt Combo (Tablet) 30.0 30 5 MG NA [employer] Private Pay 0 / 0 [place]   1 [id number] 10/14/2024 10/14/2024 Mixed Amphetamine Salts (Capsule, Extended Release) 30.0 30 25 MG NA [employer] Private Pay 0 / 0 [place]   1 [id number] 07/18/2024 07/18/2024 Amphetamine Salt Combo (Tablet) 30.0 30 5 MG NA [employer] Commercial Insurance  Active agreement on file -

## 2024-12-16 ENCOUNTER — HOSPITAL ENCOUNTER (EMERGENCY)
Facility: HOSPITAL | Age: 44
Discharge: HOME | End: 2024-12-16
Attending: EMERGENCY MEDICINE

## 2024-12-16 ENCOUNTER — APPOINTMENT (EMERGENCY)
Dept: RADIOLOGY | Facility: HOSPITAL | Age: 44
End: 2024-12-16

## 2024-12-16 VITALS
RESPIRATION RATE: 18 BRPM | SYSTOLIC BLOOD PRESSURE: 161 MMHG | DIASTOLIC BLOOD PRESSURE: 98 MMHG | TEMPERATURE: 98.2 F | OXYGEN SATURATION: 99 % | HEART RATE: 84 BPM

## 2024-12-16 DIAGNOSIS — S62.109A: Primary | ICD-10-CM

## 2024-12-16 PROCEDURE — 73110 X-RAY EXAM OF WRIST: CPT

## 2024-12-16 PROCEDURE — 99283 EMERGENCY DEPT VISIT LOW MDM: CPT

## 2024-12-16 PROCEDURE — 73130 X-RAY EXAM OF HAND: CPT

## 2024-12-16 PROCEDURE — 29125 APPL SHORT ARM SPLINT STATIC: CPT

## 2024-12-16 PROCEDURE — 99284 EMERGENCY DEPT VISIT MOD MDM: CPT

## 2024-12-16 RX ORDER — IBUPROFEN 600 MG/1
600 TABLET, FILM COATED ORAL ONCE
Status: COMPLETED | OUTPATIENT
Start: 2024-12-16 | End: 2024-12-16

## 2024-12-16 RX ORDER — ACETAMINOPHEN 325 MG/1
650 TABLET ORAL ONCE
Status: COMPLETED | OUTPATIENT
Start: 2024-12-16 | End: 2024-12-16

## 2024-12-16 RX ADMIN — ACETAMINOPHEN 650 MG: 325 TABLET, FILM COATED ORAL at 16:30

## 2024-12-16 RX ADMIN — IBUPROFEN 600 MG: 600 TABLET, FILM COATED ORAL at 16:30

## 2024-12-16 NOTE — ED ATTENDING ATTESTATION
12/16/2024  I, Juan Calhoun MD, saw and evaluated the patient. I have discussed the patient with the resident/non-physician practitioner and agree with the resident's/non-physician practitioner's findings, Plan of Care, and MDM as documented in the resident's/non-physician practitioner's note, except where noted. All available labs and Radiology studies were reviewed.  I was present for key portions of any procedure(s) performed by the resident/non-physician practitioner and I was immediately available to provide assistance.       At this point I agree with the current assessment done in the Emergency Department.  I have conducted an independent evaluation of this patient a history and physical is as follows:  45 yo M c/o right hand pain and swelling since punching a wall 1 week ago, for which he has been using an OTC brace.      Xray, treat accordingly.      ED Course         Critical Care Time  Procedures

## 2024-12-16 NOTE — DISCHARGE INSTRUCTIONS
Follow up with orthopedics for further management. Manage pain with regular use of over the counter acetaminophen and ibuprofen. You may take acetaminophen 1000mg and ibuprofen 800mg in combination up to 3 times per day to manage pain and inflammation. Continue to rest, ice, compress, and elevate injured wrist. Follow with primary care provider for review and continuation of care. If you experienced worsened swelling, numbness, tingling, pain and pressure in right wrist and forearm return to emergency department. For worsening symptoms or the development of severe headache, chest pain, SOB, abdominal pain, nausea, vomiting, diarrhea, or weakness, call 911 or return to the emergency department for further evaluation.
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
## 2024-12-19 ENCOUNTER — OFFICE VISIT (OUTPATIENT)
Dept: OBGYN CLINIC | Facility: CLINIC | Age: 44
End: 2024-12-19

## 2024-12-19 ENCOUNTER — HOSPITAL ENCOUNTER (OUTPATIENT)
Dept: CT IMAGING | Facility: HOSPITAL | Age: 44
End: 2024-12-19

## 2024-12-19 DIAGNOSIS — S62.141A CLOSED DISPLACED FRACTURE OF BODY OF HAMATE OF RIGHT WRIST, INITIAL ENCOUNTER: ICD-10-CM

## 2024-12-19 PROBLEM — S62.109A CARPAL FRACTURE: Status: ACTIVE | Noted: 2024-12-19

## 2024-12-19 PROCEDURE — 99213 OFFICE O/P EST LOW 20 MIN: CPT | Performed by: SURGERY

## 2024-12-19 PROCEDURE — 73200 CT UPPER EXTREMITY W/O DYE: CPT
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
## 2024-12-26 ENCOUNTER — ANESTHESIA EVENT (OUTPATIENT)
Age: 44
End: 2024-12-26

## 2024-12-26 ENCOUNTER — ANESTHESIA (OUTPATIENT)
Age: 44
End: 2024-12-26

## 2024-12-26 ENCOUNTER — OFFICE VISIT (OUTPATIENT)
Dept: OBGYN CLINIC | Facility: CLINIC | Age: 44
End: 2024-12-26

## 2024-12-26 DIAGNOSIS — S62.141D CLOSED DISPLACED FRACTURE OF BODY OF HAMATE OF RIGHT WRIST WITH ROUTINE HEALING, SUBSEQUENT ENCOUNTER: Primary | ICD-10-CM

## 2024-12-26 PROCEDURE — 99214 OFFICE O/P EST MOD 30 MIN: CPT | Performed by: SURGERY

## 2024-12-26 RX ORDER — SODIUM CHLORIDE, SODIUM LACTATE, POTASSIUM CHLORIDE, CALCIUM CHLORIDE 600; 310; 30; 20 MG/100ML; MG/100ML; MG/100ML; MG/100ML
125 INJECTION, SOLUTION INTRAVENOUS CONTINUOUS
Status: CANCELLED | OUTPATIENT
Start: 2024-12-27

## 2024-12-26 RX ORDER — COVID-19 ANTIGEN TEST
KIT MISCELLANEOUS
COMMUNITY

## 2024-12-26 NOTE — PRE-PROCEDURE INSTRUCTIONS
Pre-Surgery Instructions:   Medication Instructions    amphetamine-dextroamphetamine (ADDERALL XR, 25MG,) 25 MG 24 hr capsule Hold day of surgery.    amphetamine-dextroamphetamine (ADDERALL, 5MG,) 5 MG tablet Hold day of surgery.    betamethasone dipropionate (DIPROSONE) 0.05 % ointment Hold day of surgery.    ergocalciferol (VITAMIN D2) 50,000 units Hold day of surgery.    escitalopram (LEXAPRO) 5 mg tablet Take day of surgery.    Naproxen Sodium (Aleve) 220 MG CAPS Stop taking 3 days prior to surgery. Last dose 12/25    NON FORMULARY Last dose 12/26, OTC vit/supp    Medication instructions for day surgery reviewed. Please use only a sip of water to take your instructed medications. Avoid all over the counter vitamins, supplements and NSAIDS for one week prior to surgery per anesthesia guidelines. Tylenol is ok to take as needed.     You will receive a call one business day prior to surgery with an arrival time and hospital directions. If your surgery is scheduled on a Monday, the hospital will be calling you on the Friday prior to your surgery. If you have not heard from anyone by 8pm, please call the hospital supervisor through the hospital  at [phone]. ([place] [phone] or [place] [phone]).    Do not eat or drink anything after midnight the night before your surgery, including candy, mints, lifesavers, or chewing gum. Do not drink alcohol 24hrs before your surgery. Try not to smoke at least 24hrs before your surgery.       Follow the pre surgery showering instructions as listed in the “My Surgical Experience Booklet” or otherwise provided by your surgeon's office. Do not use a blade to shave the surgical area 1 week before surgery. It is okay to use a clean electric clippers up to 24 hours before surgery. Do not apply any lotions, creams, including makeup, cologne, deodorant, or perfumes after showering on the day of your surgery. Do not use dry shampoo, hair spray, hair gel, or any type  of hair products.     No contact lenses, eye make-up, or artificial eyelashes. Remove nail polish, including gel polish, and any artificial, gel, or acrylic nails if possible. Remove all jewelry including rings and body piercing jewelry.     Wear causal clothing that is easy to take on and off. Consider your type of surgery.    Keep any valuables, jewelry, piercings at home. Please bring any specially ordered equipment (sling, braces) if indicated.    Arrange for a responsible person to drive you to and from the hospital on the day of your surgery. Please confirm the visitor policy for the day of your procedure when you receive your phone call with an arrival time.     Call the surgeon's office with any new illnesses, exposures, or additional questions prior to surgery.    Please reference your “My Surgical Experience Booklet” for additional information to prepare for your upcoming surgery.

## 2024-12-26 NOTE — H&P (VIEW-ONLY)
Assessment    Right displaced fracture of hamate bone and capitate bone DOI 12/7/2024      Plan    -CT scan right hand without contrast was reviewed.  -Discussed diagnosis of fracture discussed operative and nonoperative treatment options.  -Discussing with patient about treatment options, patient would like to proceed with closed reduction pinning of right wrist.  - Standard Consent: The risks and benefits of the procedure were explained to the patient, which include, but are not limited to: Bleeding, infection, recurrence, pain, scar, damage to tendons, damage to nerves, and damage to blood vessels, failure to give desired results and complications related to anesthesia.  These risks, along with alternative conservative treatment options, and postoperative protocols were voiced back and understood by the patient.  All questions were answered to the patient's satisfaction.  The patient agrees to comply with a standard postoperative protocol, and is willing to proceed.  Education was provided. There were no barriers to learning.   -OTC pain medications as needed.  -Patient will follow-up for postop appointment.  -Right 4th and 5th CMC fracture dislocation reduction and pinning, closed vs open.  -General anesthesia.           Subjective     HPI    Patient ID:  Gwen Wright is a right hand dominant 44 y.o. male here for evaluation of the right hand.  Patient is here for CT review.  Patient states today that he continues to wear a Velcro splint over his right wrist.  Patient states he continues to have pain within his hand and wrist and is hard for him to utilize it.  Patient has not been performing any lifting activities.      The following portions of the patient's history were reviewed and updated as appropriate: allergies, current medications, past family history, past medical history, past social history, past surgical history, and problem list.    Review of Systems   Constitutional:  Positive for activity  change. Negative for chills, fever and unexpected weight change.   HENT:  Negative for hearing loss, nosebleeds and sore throat.    Eyes:  Negative for pain, redness and visual disturbance.   Respiratory:  Negative for cough, shortness of breath and wheezing.    Cardiovascular:  Negative for chest pain, palpitations and leg swelling.   Gastrointestinal:  Negative for abdominal pain, nausea and vomiting.   Endocrine: Negative for polyphagia and polyuria.   Genitourinary:  Negative for dysuria and hematuria.   Musculoskeletal:  Positive for arthralgias.        See HPI   Skin:  Negative for rash and wound.   Neurological:  Negative for dizziness, numbness and headaches.   Psychiatric/Behavioral:  Negative for decreased concentration and suicidal ideas. The patient is not nervous/anxious.         Objective    Imaging:  Right hand CT scan 12/19/2024    FINDINGS:     1.  Comminuted and displaced fracture involving the dorsal base of the hamate bone, with dorsal subluxation of the largest fragment and base of the fourth metacarpal bone.  2.  Mildly displaced fracture involving the dorsal aspect of the distal capitate bone.     Physical Exam  Vitals and nursing note reviewed.   Constitutional:       General: He is not in acute distress.     Appearance: He is well-developed.   HENT:      Head: Normocephalic and atraumatic.   Eyes:      General: No scleral icterus.     Conjunctiva/sclera: Conjunctivae normal.   Cardiovascular:      Rate and Rhythm: Normal rate and regular rhythm.      Heart sounds: No murmur heard.  Pulmonary:      Effort: Pulmonary effort is normal. No respiratory distress.      Breath sounds: Normal breath sounds.   Abdominal:      General: There is no distension.      Palpations: Abdomen is soft.   Musculoskeletal:      Cervical back: Neck supple.   Skin:     General: Skin is warm and dry.      Capillary Refill: Capillary refill takes less than 2 seconds.   Neurological:      Mental Status: He is alert.    Psychiatric:         Mood and Affect: Mood normal.          General appearance:  NAD   Cardiac:  Regular rate  Lungs:  Unlabored breathing  Abdomen:  Non-distended    Orthopedic Examination:  Right hand     Inspection: No open wounds.  There is dorsal hand swelling at the fracture site.    Palpation: Tender to palpation metacarpal base region.    Range-of-motion:  + Active finger and wrist flexion extension but reduced secondary to pain and swelling    Strength: Deferred    Sensation: Intact to light touch median radial ulnar nerve distribution    Special Tests: Palpable radial pulse      Scribe Attestation      I,:  Jordon Young am acting as a scribe while in the presence of the attending physician.:       I,:  Miki Walsh MD personally performed the services described in this documentation    as scribed in my presence.:

## 2024-12-27 ENCOUNTER — HOSPITAL ENCOUNTER (OUTPATIENT)
Age: 44
Discharge: HOME | End: 2024-12-27
Payer: COMMERCIAL

## 2024-12-27 ENCOUNTER — ANESTHESIA (OUTPATIENT)
Age: 44
End: 2024-12-27
Payer: COMMERCIAL

## 2024-12-27 ENCOUNTER — ANESTHESIA EVENT (OUTPATIENT)
Age: 44
End: 2024-12-27
Payer: COMMERCIAL

## 2024-12-27 ENCOUNTER — HOSPITAL ENCOUNTER (OUTPATIENT)
Age: 44
Setting detail: OUTPATIENT SURGERY
Discharge: HOME | End: 2024-12-27
Attending: SURGERY | Admitting: SURGERY
Payer: COMMERCIAL

## 2024-12-27 VITALS
DIASTOLIC BLOOD PRESSURE: 95 MMHG | OXYGEN SATURATION: 100 % | RESPIRATION RATE: 11 BRPM | HEIGHT: 73 IN | BODY MASS INDEX: 25.05 KG/M2 | HEART RATE: 59 BPM | SYSTOLIC BLOOD PRESSURE: 146 MMHG | TEMPERATURE: 98.2 F | WEIGHT: 189 LBS

## 2024-12-27 DIAGNOSIS — S62.141D CLOSED DISPLACED FRACTURE OF BODY OF HAMATE OF RIGHT WRIST WITH ROUTINE HEALING, SUBSEQUENT ENCOUNTER: ICD-10-CM

## 2024-12-27 DIAGNOSIS — S62.141D CLOSED DISPLACED FRACTURE OF BODY OF HAMATE OF RIGHT WRIST WITH ROUTINE HEALING, SUBSEQUENT ENCOUNTER: Primary | ICD-10-CM

## 2024-12-27 DIAGNOSIS — F17.210 NICOTINE DEPENDENCE, CIGARETTES, UNCOMPLICATED: ICD-10-CM

## 2024-12-27 PROCEDURE — C1713 ANCHOR/SCREW BN/BN,TIS/BN: HCPCS | Performed by: SURGERY

## 2024-12-27 PROCEDURE — 73120 X-RAY EXAM OF HAND: CPT

## 2024-12-27 PROCEDURE — 26676 PIN HAND DISLOCATION: CPT | Performed by: SURGERY

## 2024-12-27 PROCEDURE — 26676 PIN HAND DISLOCATION: CPT

## 2024-12-27 DEVICE — C-WIRE PAK DOUBLE ENDED ORTHOPAEDIC WIRE, SPADE, .045" (1.14 MM)
Type: IMPLANTABLE DEVICE | Site: HAND | Status: FUNCTIONAL
Brand: C-WIRE

## 2024-12-27 RX ORDER — ACETAMINOPHEN 325 MG/1
650 TABLET ORAL EVERY 6 HOURS PRN
Status: DISCONTINUED | OUTPATIENT
Start: 2024-12-27 | End: 2024-12-27 | Stop reason: HOSPADM

## 2024-12-27 RX ORDER — MIDAZOLAM HYDROCHLORIDE 2 MG/2ML
INJECTION, SOLUTION INTRAMUSCULAR; INTRAVENOUS AS NEEDED
Status: DISCONTINUED | OUTPATIENT
Start: 2024-12-27 | End: 2024-12-27

## 2024-12-27 RX ORDER — SENNOSIDES 8.6 MG
650 CAPSULE ORAL EVERY 8 HOURS PRN
Qty: 30 TABLET | Refills: 0 | Status: SHIPPED | OUTPATIENT
Start: 2024-12-27

## 2024-12-27 RX ORDER — FENTANYL CITRATE 50 UG/ML
INJECTION, SOLUTION INTRAMUSCULAR; INTRAVENOUS AS NEEDED
Status: DISCONTINUED | OUTPATIENT
Start: 2024-12-27 | End: 2024-12-27

## 2024-12-27 RX ORDER — HYDROMORPHONE HCL/PF 1 MG/ML
0.5 SYRINGE (ML) INJECTION
Status: DISCONTINUED | OUTPATIENT
Start: 2024-12-27 | End: 2024-12-27 | Stop reason: HOSPADM

## 2024-12-27 RX ORDER — ONDANSETRON 2 MG/ML
INJECTION INTRAMUSCULAR; INTRAVENOUS AS NEEDED
Status: DISCONTINUED | OUTPATIENT
Start: 2024-12-27 | End: 2024-12-27

## 2024-12-27 RX ORDER — SODIUM CHLORIDE, SODIUM LACTATE, POTASSIUM CHLORIDE, CALCIUM CHLORIDE 600; 310; 30; 20 MG/100ML; MG/100ML; MG/100ML; MG/100ML
125 INJECTION, SOLUTION INTRAVENOUS CONTINUOUS
Status: DISCONTINUED | OUTPATIENT
Start: 2024-12-27 | End: 2024-12-27 | Stop reason: HOSPADM

## 2024-12-27 RX ORDER — BUPIVACAINE HYDROCHLORIDE 2.5 MG/ML
INJECTION, SOLUTION EPIDURAL; INFILTRATION; INTRACAUDAL AS NEEDED
Status: DISCONTINUED | OUTPATIENT
Start: 2024-12-27 | End: 2024-12-27 | Stop reason: HOSPADM

## 2024-12-27 RX ORDER — HYDROCODONE BITARTRATE AND ACETAMINOPHEN 5; 325 MG/1; MG/1
1 TABLET ORAL EVERY 6 HOURS PRN
Qty: 9 TABLET | Refills: 0 | Status: SHIPPED | OUTPATIENT
Start: 2024-12-27 | End: 2025-01-06

## 2024-12-27 RX ORDER — PROPOFOL 10 MG/ML
INJECTION, EMULSION INTRAVENOUS AS NEEDED
Status: DISCONTINUED | OUTPATIENT
Start: 2024-12-27 | End: 2024-12-27

## 2024-12-27 RX ORDER — ACETAMINOPHEN 10 MG/ML
1000 INJECTION, SOLUTION INTRAVENOUS ONCE
Status: COMPLETED | OUTPATIENT
Start: 2024-12-27 | End: 2024-12-27

## 2024-12-27 RX ORDER — LIDOCAINE HYDROCHLORIDE 10 MG/ML
INJECTION, SOLUTION EPIDURAL; INFILTRATION; INTRACAUDAL; PERINEURAL AS NEEDED
Status: DISCONTINUED | OUTPATIENT
Start: 2024-12-27 | End: 2024-12-27

## 2024-12-27 RX ADMIN — HYDROMORPHONE HYDROCHLORIDE 0.5 MG: 1 INJECTION, SOLUTION INTRAMUSCULAR; INTRAVENOUS; SUBCUTANEOUS at 16:28

## 2024-12-27 RX ADMIN — PROPOFOL 50 MG: 10 INJECTION, EMULSION INTRAVENOUS at 15:24

## 2024-12-27 RX ADMIN — ONDANSETRON 4 MG: 2 INJECTION INTRAMUSCULAR; INTRAVENOUS at 15:23

## 2024-12-27 RX ADMIN — HYDROMORPHONE HYDROCHLORIDE 0.5 MG: 1 INJECTION, SOLUTION INTRAMUSCULAR; INTRAVENOUS; SUBCUTANEOUS at 16:16

## 2024-12-27 RX ADMIN — MIDAZOLAM 2 MG: 1 INJECTION INTRAMUSCULAR; INTRAVENOUS at 15:19

## 2024-12-27 RX ADMIN — SODIUM CHLORIDE, SODIUM LACTATE, POTASSIUM CHLORIDE, AND CALCIUM CHLORIDE 125 ML/HR: .6; .31; .03; .02 INJECTION, SOLUTION INTRAVENOUS at 13:21

## 2024-12-27 RX ADMIN — FENTANYL CITRATE 25 MCG: 50 INJECTION INTRAMUSCULAR; INTRAVENOUS at 15:39

## 2024-12-27 RX ADMIN — FENTANYL CITRATE 50 MCG: 50 INJECTION INTRAMUSCULAR; INTRAVENOUS at 15:31

## 2024-12-27 RX ADMIN — FENTANYL CITRATE 25 MCG: 50 INJECTION INTRAMUSCULAR; INTRAVENOUS at 15:37

## 2024-12-27 RX ADMIN — PROPOFOL 200 MG: 10 INJECTION, EMULSION INTRAVENOUS at 15:23

## 2024-12-27 RX ADMIN — ACETAMINOPHEN 1000 MG: 10 INJECTION INTRAVENOUS at 16:26

## 2024-12-27 RX ADMIN — LIDOCAINE HYDROCHLORIDE 50 MG: 10 SOLUTION INTRAVENOUS at 15:23

## 2024-12-27 NOTE — OP NOTE
OPERATIVE REPORT  PATIENT NAME: Gwen Wright    :  1980  MRN: [id number]  Pt Location: [facility] OR ROOM 05    SURGERY DATE: 2024    Surgeons and Role:     * Miki Walsh MD - Primary    Preop Diagnosis:  Closed displaced fracture of body of hamate of right wrist with routine healing, subsequent encounter [B42.327D]    Post-Op Diagnosis Codes:     * Closed displaced fracture of body of hamate of right wrist with routine healing, subsequent encounter [R62.141D]    Procedure(s):  Right - REDUCTION AND PINNING OF RIGHT 3RD, 4TH AND 5TH CMC FRACTURE DISLOCATION. CLOSED CPT:  06215    Specimen(s):  * No specimens in log *    Estimated Blood Loss:   Minimal    Drains:  * No LDAs found *    Anesthesia Type:   General    Operative Indications:  Closed displaced fracture of body of hamate of right wrist with routine healing, subsequent encounter [B92.049D]      Operative Findings:  Acceptable alignment and hardware placement      Complications:   None    Procedure and Technique:  The right upper extremity was prepped and draped in a sterile fashion after placement of an upper arm tourniquet.  C-arm fluoroscopy was used and reduction maneuvers were performed.  The images were independently reviewed and satisfactory overall alignment could be achieved.  It was noted that the third carpometacarpal joint was also unstable but could be reduced easily.  Distraction was performed on the fourth and fifth CMC joints and K wire was placed through the fifth metacarpal and into the capitate.  An additional K wire was then placed through the fifth metacarpal and into the fourth metacarpal followed by the third metacarpal followed by the second metacarpal while maintaining reduction on the third and fourth carpometacarpal joints.  Searfoss begin use during this process to ensure adequate reduction and maintenance of reduction as well as hardware placement.  0.045 inch K wires with a size used.  Final C-arm images were obtained and  independently reviewed with satisfactory restoration of the carpometacarpal joint spacing as well as corrective dorsal subluxation of the fourth and third carpometacarpal joints.  The K wires were trimmed appropriate length and Jurgan ball caps were applied.  A field block was performed with Marcaine 0.25% plain.  4 4 gauze applied over the pin sites followed by Webril overwrap and fabrication of a 3 inch volar Ortho-Glass splint from the palmar digital crease level to the mid forearm.  Ace bandage wraps were applied to keep the splint in place.  The tourniquet was not used during this procedure.  The patient was extubated and transferred to recovery room in stable condition.   I was present for the entire procedure.    Patient Disposition:  PACU     My Assistant was necessary throughout the procedure(s) for retraction and positioning.    I understand that section 1842 (b)(7)(D) of the Social Security Act generally prohibits Medicare physician fee schedule payment for the services of assistants-at-surgery in teaching hospitals when qualified residents are available to furnish such services. I certify that the services for which payment is claimed were medically necessary, and that no qualified resident was available to perform the services. I further understand that these services are subject to post-payment review by the Medicare carrier.           SIGNATURE: Miki Walsh MD  DATE: December 27, 2024  TIME: 4:06 PM
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
## 2024-12-27 NOTE — ANESTHESIA POSTPROCEDURE EVALUATION
Post-Op Assessment Note    CV Status:  Stable  Pain Score: 0    Pain management: adequate       Mental Status:  Alert and awake   Hydration Status:  Euvolemic   PONV Controlled:  Controlled   Airway Patency:  Patent     Post Op Vitals Reviewed: Yes    No anethesia notable event occurred.    Staff: Anesthesiologist, CRNA           Last Filed PACU Vitals:  Vitals Value Taken Time   Temp 98.2 °F (36.8 °C) 12/27/24 1604   Pulse 50 12/27/24 1605   /84 12/27/24 1604   Resp 15 12/27/24 1605   SpO2 100 % 12/27/24 1605   Vitals shown include unfiled device data.    Modified Manolo:  No data recorded

## 2024-12-27 NOTE — DISCHARGE INSTR - AVS FIRST PAGE
Special Instructions:  Rest right hand and elevate frequently.  Do not remove splint. All dressings/bandages to remain intact until your follow-up appointment.  May shower with protective cover on right hand.  Do not submerge.  No driving.  Take Tylenol and Motrin as needed for for mild pain.  Take Norco as needed for moderate to severe pain.  Please call Dr. Walsh's office to schedule your 2 week follow-up appointment.

## 2024-12-27 NOTE — ANESTHESIA POSTPROCEDURE EVALUATION
Post-Op Assessment Note    CV Status:  Stable    Pain management: adequate       Mental Status:  Alert and awake   Hydration Status:  Euvolemic   PONV Controlled:  Controlled   Airway Patency:  Patent     Post Op Vitals Reviewed: Yes    No anethesia notable event occurred.    Staff: Anesthesiologist       Last Filed PACU Vitals:  Vitals Value Taken Time   Temp 98.2 °F (36.8 °C) 12/27/24 1604   Pulse 61 12/27/24 1634   /94 12/27/24 1630   Resp 10 12/27/24 1634   SpO2 100 % 12/27/24 1634   Vitals shown include unfiled device data.    Modified Manolo:  Activity: 2 (12/27/2024  4:30 PM)  Respiration: 2 (12/27/2024  4:30 PM)  Circulation: 2 (12/27/2024  4:30 PM)  Consciousness: 2 (12/27/2024  4:30 PM)  Oxygen Saturation: 2 (12/27/2024  4:30 PM)  Modified Manolo Score: 10 (12/27/2024  4:30 PM)

## 2024-12-27 NOTE — INTERVAL H&P NOTE
H&P reviewed. After examining the patient I find no changes in the patients condition since the H&P had been written.    Vitals:    12/27/24 1307   BP: 132/79   Pulse: 59   Resp: 18   Temp: 98.1 °F (36.7 °C)   SpO2: 100%

## 2025-01-08 ENCOUNTER — OFFICE VISIT (OUTPATIENT)
Dept: OBGYN CLINIC | Facility: CLINIC | Age: 45
End: 2025-01-08

## 2025-01-08 DIAGNOSIS — S62.141A CLOSED DISPLACED FRACTURE OF BODY OF HAMATE OF RIGHT WRIST, INITIAL ENCOUNTER: ICD-10-CM

## 2025-01-08 DIAGNOSIS — S62.141D CLOSED DISPLACED FRACTURE OF BODY OF HAMATE OF RIGHT WRIST WITH ROUTINE HEALING, SUBSEQUENT ENCOUNTER: Primary | ICD-10-CM

## 2025-01-08 PROCEDURE — 99024 POSTOP FOLLOW-UP VISIT: CPT | Performed by: SURGERY

## 2025-01-08 PROCEDURE — 29125 APPL SHORT ARM SPLINT STATIC: CPT | Performed by: PHYSICIAN ASSISTANT

## 2025-01-08 NOTE — PROGRESS NOTES
HPI:  44M here for first post-op visit.  Today he states he is doing well since the surgery.  He notes some continued pain in the hand for which she is mostly taking over-the-counter pain medications.  He has some Percocet left but takes it very sparingly.  No numbness and tingling in the fingers.  No fever or chills.  No significant issues with the splint.      PE:  Right hand: Pin site x 2 clean dry intact, no signs of infection.  There is mild generalized dorsal hand swelling.  Fingers well aligned.  Sensation intact to light touch median radial ulnar nerve distribution.  Palpable radial pulse.      A/P:  Right 3rd, 4th, 5th reduction and pin fixation of CMC fracture/dislocation, closed, 12/27/2024.  -Remain NWB RUE in splint.  New splint fabricated in the office today.  -OTC pain medications as needed.  -F/U 2-3 weeks for re-evaluation and new right hand x-rays, and likely pin removal.  -All questions answered.    Splint application    Date/Time: 1/8/2025 10:45 AM    Performed by: Jeff Askew PA-C  Authorized by: Miki Walsh MD  Universal Protocol:  Consent: Verbal consent obtained.  Risks and benefits: risks, benefits and alternatives were discussed  Consent given by: patient  Patient identity confirmed: verbally with patient    Pre-procedure details:     Sensation:  Normal  Procedure details:     Laterality:  Right    Location:  Wrist    Wrist:  R wrist    Splint type:  Short arm splint, static (forearm to hand)    Supplies:  Ortho-Glass  Post-procedure details:     Pain:  Improved    Sensation:  Normal    Patient tolerance of procedure:  Tolerated well, no immediate complications

## 2025-01-13 ENCOUNTER — TELEPHONE (OUTPATIENT)
Dept: PSYCHIATRY | Facility: CLINIC | Age: 45
End: 2025-01-13

## 2025-01-13 ENCOUNTER — TELEMEDICINE (OUTPATIENT)
Dept: PSYCHIATRY | Facility: CLINIC | Age: 45
End: 2025-01-13

## 2025-01-13 DIAGNOSIS — F90.0 ATTENTION DEFICIT HYPERACTIVITY DISORDER (ADHD), PREDOMINANTLY INATTENTIVE TYPE: ICD-10-CM

## 2025-01-13 DIAGNOSIS — F41.9 ANXIETY: ICD-10-CM

## 2025-01-13 DIAGNOSIS — F32.9 REACTIVE DEPRESSION (SITUATIONAL): Primary | ICD-10-CM

## 2025-01-13 PROCEDURE — 99213 OFFICE O/P EST LOW 20 MIN: CPT | Performed by: NURSE PRACTITIONER

## 2025-01-13 RX ORDER — DEXTROAMPHETAMINE SACCHARATE, AMPHETAMINE ASPARTATE MONOHYDRATE, DEXTROAMPHETAMINE SULFATE AND AMPHETAMINE SULFATE 6.25; 6.25; 6.25; 6.25 MG/1; MG/1; MG/1; MG/1
25 CAPSULE, EXTENDED RELEASE ORAL EVERY MORNING
Qty: 30 CAPSULE | Refills: 0 | Status: SHIPPED | OUTPATIENT
Start: 2025-01-13

## 2025-01-13 RX ORDER — DEXTROAMPHETAMINE SACCHARATE, AMPHETAMINE ASPARTATE, DEXTROAMPHETAMINE SULFATE AND AMPHETAMINE SULFATE 1.25; 1.25; 1.25; 1.25 MG/1; MG/1; MG/1; MG/1
5 TABLET ORAL DAILY
Qty: 30 TABLET | Refills: 0 | Status: SHIPPED | OUTPATIENT
Start: 2025-01-13

## 2025-01-13 RX ORDER — ESCITALOPRAM OXALATE 10 MG/1
10 TABLET ORAL DAILY
Qty: 30 TABLET | Refills: 2 | Status: SHIPPED | OUTPATIENT
Start: 2025-01-13

## 2025-01-13 NOTE — ASSESSMENT & PLAN NOTE
Status: At goal  Plan: Continue Adderall XR 25 mg daily, Adderall IR 5 mg daily  Orders:    amphetamine-dextroamphetamine (ADDERALL XR, 25MG,) 25 MG 24 hr capsule; Take 1 capsule (25 mg total) by mouth every morning Max Daily Amount: 25 mg    amphetamine-dextroamphetamine (ADDERALL, 5MG,) 5 MG tablet; Take 1 tablet (5 mg total) by mouth daily Max Daily Amount: 5 mg

## 2025-01-13 NOTE — TELEPHONE ENCOUNTER
CM received internal referral request for assistance with financial resources.     Patient added to CM work queue. If patient reaches out regarding their referral, please forward their message to our CM Pool at [id number].

## 2025-01-13 NOTE — ASSESSMENT & PLAN NOTE
Status: Not at goal, improved  Considerations: Driven by psychosocial stressors  Plan: Increase Lexapro to 10 mg daily  Orders:    escitalopram (LEXAPRO) 10 mg tablet; Take 1 tablet (10 mg total) by mouth daily
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
## 2025-01-14 NOTE — TELEPHONE ENCOUNTER
Outreached patient via phone at [phone]. The patient was not available and a voicemail was left with callback instructions for this writer at [phone].

## 2025-01-16 ENCOUNTER — TELEPHONE (OUTPATIENT)
Dept: OBGYN CLINIC | Facility: CLINIC | Age: 45
End: 2025-01-16

## 2025-01-16 ENCOUNTER — TELEPHONE (OUTPATIENT)
Dept: PSYCHIATRY | Facility: CLINIC | Age: 45
End: 2025-01-16

## 2025-01-16 ENCOUNTER — OFFICE VISIT (OUTPATIENT)
Dept: OBGYN CLINIC | Facility: CLINIC | Age: 45
End: 2025-01-16

## 2025-01-16 DIAGNOSIS — S62.141D CLOSED DISPLACED FRACTURE OF BODY OF HAMATE OF RIGHT WRIST WITH ROUTINE HEALING, SUBSEQUENT ENCOUNTER: Primary | ICD-10-CM

## 2025-01-16 PROCEDURE — 29125 APPL SHORT ARM SPLINT STATIC: CPT | Performed by: PHYSICIAN ASSISTANT

## 2025-01-16 PROCEDURE — 99024 POSTOP FOLLOW-UP VISIT: CPT | Performed by: SURGERY

## 2025-01-16 NOTE — PROGRESS NOTES
Latha Acevedo Spouse - [phone]    Secondary Decision Maker: Sally Shrestha - Daughter - [phone]  Click here to complete [address] including selection of the Healthcare Decision Maker Relationship (ie \"Primary\")  Today we documented Decision Maker(s) consistent with ACP documents on file. Content/Action Overview:    Has ACP document(s) on file - reflects the patient's care preferences      Length of Voluntary ACP Conversation in minutes:  [address] Patient is status post right 3rd, 4th, 5th reduction and pin fixation of CMC fracture/dislocation, closed, 12/27/2024.   He presents today sooner than next scheduled appointment due to splint and Ace wrap feeling uncomfortable.  He thinks the Ace wrap was a little too tight and the splint was starting to ride too far on his pinky.  The Ace wrap and splint were removed and pin sites all look clean dry intact without signs of infection.  There is generalized hand swelling that is unchanged from previous visit last week.  A new volar short arm splint applied and the patient tolerated well.  He has scheduled follow-up in a few weeks with us.  He will call us with any questions or concerns in the interim.    Splint application    Date/Time: 1/16/2025 3:45 PM    Performed by: Jeff Askew PA-C  Authorized by: Jeff Askew PA-C  Universal Protocol:  Consent: Verbal consent obtained.  Risks and benefits: risks, benefits and alternatives were discussed  Consent given by: patient  Patient identity confirmed: verbally with patient    Pre-procedure details:     Sensation:  Normal  Procedure details:     Laterality:  Right    Location:  Wrist    Wrist:  R wrist    Splint type:  Short arm splint, static (forearm to hand)    Supplies:  Ortho-Glass  Post-procedure details:     Pain:  Improved    Sensation:  Normal    Patient tolerance of procedure:  Tolerated well, no immediate complications

## 2025-01-16 NOTE — TELEPHONE ENCOUNTER
Called patient regarding patient message about bandage on right hand, patient was made aware that he is okay to come in today to see  before 3:45 pm.

## 2025-01-16 NOTE — TELEPHONE ENCOUNTER
1st outreach attempt. Called and left message for patient to return a call to [phone] regarding ERICK appt for Medication Management . Please transfer call to Psych Support Services for assistance.

## 2025-01-23 ENCOUNTER — TELEPHONE (OUTPATIENT)
Dept: PSYCHIATRY | Facility: CLINIC | Age: 45
End: 2025-01-23

## 2025-01-23 NOTE — TELEPHONE ENCOUNTER
Outreached patient via phone at [phone]. The patient was not available and a voicemail was left with callback instructions for this writer at [phone].

## 2025-01-23 NOTE — TELEPHONE ENCOUNTER
This writer intended to discuss the following resources:    [place] Customer Assistance Program (PCAP)  1-896.913.4528    [facility]  1-186.179.7485  Www.[employer]    Low-Income Home Energy Assistance Program (LIHEAP)  1-494.993.2141  Www.[facility].pa.    [employer] [place] / [place]  If you are calling to apply or renew cash, medical, or SNAP assistance you may reach out to the [facility] at 1-478.148.7423.    Funding for mortgage relief, might be able to get help through [employer]. Patient will need a Dx Letter for this. Does have a wait time.    One time housing relief might be available via [employer]  https://lvr[facility].org/wp-content/uploads/2024/03/[employer]-One-Pager-3-2024.pdf

## 2025-01-23 NOTE — TELEPHONE ENCOUNTER
2nd outreach attempt. Called and left message for patient to return a call to [phone] regarding ERICK appt for Medication Management . Please transfer call to Psych Support Services for assistance.

## 2025-01-29 ENCOUNTER — OFFICE VISIT (OUTPATIENT)
Dept: OBGYN CLINIC | Facility: CLINIC | Age: 45
End: 2025-01-29

## 2025-01-29 VITALS — WEIGHT: 189 LBS | BODY MASS INDEX: 25.05 KG/M2 | HEIGHT: 73 IN

## 2025-01-29 DIAGNOSIS — S62.141D CLOSED DISPLACED FRACTURE OF BODY OF HAMATE OF RIGHT WRIST WITH ROUTINE HEALING, SUBSEQUENT ENCOUNTER: Primary | ICD-10-CM

## 2025-01-29 PROCEDURE — 99024 POSTOP FOLLOW-UP VISIT: CPT | Performed by: SURGERY

## 2025-01-29 NOTE — PROGRESS NOTES
HPI:  44M here for post-op visit.  Today he states overall he is doing better.  He had to replace the Ace wrap because it got wet.  He does note decreased swelling in the hand and less pain.  No issues with the pin sites.  No fever or chills.      PE:  Right hand: Pin site x 2 clean dry intact, no signs of infection.  Dorsal hand swelling is decreased compared to last visit.  Fingers well aligned however there is flexion deformity of the small finger DIP joint.  NTTP at the fracture sites.  Sensation intact to light touch median radial ulnar nerve distribution.  Palpable radial pulse.      A/P:  Right 3rd, 4th, 5th reduction and pin fixation of CMC fracture/dislocation, closed, 12/27/2024.  Doing well.  -X-rays in the office today demonstrate healed fractures in good alignment.  -Pins x 2 removed in the office today and Band-Aid placed at the site.  -No further splinting is recommended.  Begin active and passive gentle range of motion exercises of the wrist hand and fingers.  Exercises were demonstrated today.  -No firm grasping or heavy lifting yet.  -Stack splint size 3 applied to the left small finger DIP joint today.  -Follow-up in 2 weeks for re-evaluation and progression of activity level.

## 2025-02-07 ENCOUNTER — TELEPHONE (OUTPATIENT)
Dept: PSYCHIATRY | Facility: CLINIC | Age: 45
End: 2025-02-07

## 2025-02-07 NOTE — TELEPHONE ENCOUNTER
3rd outreach attempt. Called and left message for patient to return a call to [phone] regarding ERICK appt for Medication Management . Please transfer call to Psych Support Services for assistance.

## 2025-02-12 ENCOUNTER — OFFICE VISIT (OUTPATIENT)
Dept: OBGYN CLINIC | Facility: CLINIC | Age: 45
End: 2025-02-12

## 2025-02-12 DIAGNOSIS — S63.054D DISLOCATION OF CARPOMETACARPAL JOINT OF RIGHT HAND, SUBSEQUENT ENCOUNTER: ICD-10-CM

## 2025-02-12 DIAGNOSIS — S62.141D CLOSED DISPLACED FRACTURE OF BODY OF HAMATE OF RIGHT WRIST WITH ROUTINE HEALING, SUBSEQUENT ENCOUNTER: Primary | ICD-10-CM

## 2025-02-12 PROCEDURE — 99024 POSTOP FOLLOW-UP VISIT: CPT | Performed by: SURGERY

## 2025-02-12 NOTE — PROGRESS NOTES
HPI:  44M here for post-op visit.  Today he states overall he is doing better since last visit. Still feels the wrist will lock up ay times with activities and he wears the brace to drive at times. Minimal pain      PE:  Right hand: Pin site x 2 clean dry intact, no signs of infection.  Short composite fist 2nd to stiffness at small finger DIP joint   NTTP at the fracture sites.  Sensation intact to light touch median radial ulnar nerve distribution.  Palpable radial pulse.  AROM wrist: 40 deg flexion; 32 deg extension    A/P:  Right 3rd, 4th, 5th reduction and pin fixation of CMC fracture/dislocation, closed, 12/27/2024.  Doing well.  - Cont splinting of small finger DIP joint  - Cont working on wrist ROM and composite fist formation  - Start increasing activities as tolerated  - NSAIDs as needed  - F/U 4 weeks ROM check

## 2025-02-13 ENCOUNTER — TELEPHONE (OUTPATIENT)
Dept: PSYCHIATRY | Facility: CLINIC | Age: 45
End: 2025-02-13

## 2025-02-13 NOTE — LETTER
02/13/25       Gwen Wright   [address] [address]       Dear Gwen Wright:    This letter is to inform you that LACI Ferguson is no longer seeing patients at [facility].    If you wish to return to [facility], you will need to have transfer of care appointment with a new provider. Please call [phone] to schedule a new psychiatric intake if you are interested in doing so.      Please follow-up with your primary care provider for continual care.  When you have scheduled an appointment with another agency, please feel free to complete a release of information so that your records can be transferred to your new provider prior to your first appointment.  This will aid with the continuity of your care.      Sincerely,           [facility]        We will continue to provide psychotropic medications and/or emergency counseling as deemed appropriate by clinical staff for 45 days from receipt of this letter.                For a referral to another agency, we would suggest that you contact your primary health care provider or insurance company.   Please see Provider's List of agencies below:    Outpatient Mental Health Adult  [facility].  [address]  [place].  [phone]   Donte Rojo MD to [address] [facility].  [place]. [phone]   [facility] [address] [place] [address]. [place]. [phone]   Candace Easton MD [address] [place] [address]. [phone]   Scottie Espinosa MD, [address] , [place]. [phone]   Outpatient Mental Health Children, Adolescents and Family  [facility] [address]: [address] [place] [address] [phone]  [facility] [address]  [place] [address]  and [place] [address], [address]   [phone]  [facility] (14 and over)  [address] [place]  [phone]   [phone]  Outpatient Mental Health [place] Speaking  [facility] [address], [place] [address] [phone]  [facility]:   [place] [facility] [place] [facility] [place] [address] Phone: [phone]  [facility] Outpatient:Garett Herzog, [address] [place] [address] New Admissions [phone] Local [phone]  [facility]:   [facility] :[address] [place], [place] [address] Phone: [phone]  [facility] Outpatient: [address] [place] [address] Phone: New Admissions [phone] / Local Office [phone]  Drug & Alcohol Services:  [facility]:   [phone] or [phone]  [facility]:  [phone] or [phone]  [facility]:  [phone]  [facility]:  [phone]  [facility]: [phone]    [facility]:   [facility] Alcohol [facility]:  [address] [place] [address]  Phone: [phone]  [facility] CRISIS NUMBERS:    [place]:  [phone]    [place]: [phone] or [phone]    [place] / [place]: [phone]    [place]: [phone]    [place]: [phone]    [place]: [phone] ([phone])    [place]: [phone]    National Suicide Prevention Hotline:  1-313.980.3460 (TALK)

## 2025-02-13 NOTE — TELEPHONE ENCOUNTER
Letter sent via Z[employer] and to address on file to inform patient that current medication management provider will be leaving the clinic. Office number provided to contact if still interested in services.

## 2025-02-28 ENCOUNTER — TELEPHONE (OUTPATIENT)
Dept: OBGYN CLINIC | Facility: CLINIC | Age: 45
End: 2025-02-28

## 2025-02-28 NOTE — TELEPHONE ENCOUNTER
Left message for patient to reschedule his appointment with Dr. Walsh.  Dr. Walsh is not in the [place] office on Wednesdays.    2/28 LB

## 2025-03-13 ENCOUNTER — OFFICE VISIT (OUTPATIENT)
Dept: OBGYN CLINIC | Facility: CLINIC | Age: 45
End: 2025-03-13

## 2025-03-13 DIAGNOSIS — S62.141D CLOSED DISPLACED FRACTURE OF BODY OF HAMATE OF RIGHT WRIST WITH ROUTINE HEALING, SUBSEQUENT ENCOUNTER: ICD-10-CM

## 2025-03-13 DIAGNOSIS — S63.054D DISLOCATION OF CARPOMETACARPAL JOINT OF RIGHT HAND, SUBSEQUENT ENCOUNTER: ICD-10-CM

## 2025-03-13 DIAGNOSIS — Z47.89 AFTERCARE FOLLOWING SURGERY OF THE MUSCULOSKELETAL SYSTEM: Primary | ICD-10-CM

## 2025-03-13 PROCEDURE — 99024 POSTOP FOLLOW-UP VISIT: CPT | Performed by: SURGERY

## 2025-03-13 NOTE — PROGRESS NOTES
Name: Gwen Wright      : 1980      MRN: [id number]  Encounter Provider: Miki Walsh MD  Encounter Date: 3/13/2025   Encounter department: [facility] [place]      Assessment & Plan  Aftercare following surgery of the musculoskeletal system  S/p R 3rd, 4th, 5th closed reduction and pin fixation of CMC fractures/dislocation on 2024.  Doing well.  WBAT RUE  No brace necessary, and no restrictions.  May experience achiness and swelling but this and normal strength should improve with time and normal use of the hand.  Can follow-up PRN, call with any questions or concerns.            History of Present Illness   Patient is a 44 y.o. male here for postop visit.  He states overall he is doing well but still has achiness about the hand and wrist, not severe.  He is getting back to normal daily activities.  He is unable to lift heavy weights yet but has returned to the gym.  No numbness and tingling into the fingers.  No other acute complaints offered today.  Patient is Right Handed.      Review of Systems A 10 point ROS was performed; negative except as noted above.     Objective     Imaging  No new imaging today     Physical Exam Right hand   Inspection: No open wounds or erythema.  No ecchymosis or swelling.    Palpation: Nontender to palpation at the previous fracture sites.    Range-of-motion:  Normal wrist ROM, full composite fist.    Strength:  5/5 wrist flexion extension,     Sensation:  ILT m/r/u nerve distributions.    Special Tests:  Palpable radial pulse  UE warm and well perfused.        Jeff Askew PA-C

## 2025-03-14 ENCOUNTER — OFFICE VISIT (OUTPATIENT)
Age: 45
End: 2025-03-14
Payer: MEDICARE

## 2025-03-14 VITALS
DIASTOLIC BLOOD PRESSURE: 60 MMHG | BODY MASS INDEX: 26.41 KG/M2 | HEIGHT: 72 IN | SYSTOLIC BLOOD PRESSURE: 130 MMHG | TEMPERATURE: 98.3 F | WEIGHT: 195 LBS | HEART RATE: 61 BPM | OXYGEN SATURATION: 98 % | RESPIRATION RATE: 16 BRPM

## 2025-03-14 DIAGNOSIS — R42 VERTIGO: Primary | ICD-10-CM

## 2025-03-14 DIAGNOSIS — R73.01 IFG (IMPAIRED FASTING GLUCOSE): ICD-10-CM

## 2025-03-14 PROCEDURE — 99214 OFFICE O/P EST MOD 30 MIN: CPT | Performed by: FAMILY MEDICINE

## 2025-03-17 NOTE — ASSESSMENT & PLAN NOTE
Will refer to vestibular therapy. Discussed supportive care and return parameters.   Orders:    Ambulatory Referral to Physical Therapy; Future    CBC and differential; Future    Comprehensive metabolic panel; Future    TSH, 3rd generation with Free T4 reflex; Future    Lipid Panel with Direct LDL reflex; Future    Hemoglobin A1C; Future    PSA, Total Screen; Future

## 2025-03-17 NOTE — PROGRESS NOTES
Name: Gwen Wright      : 1980      MRN: [id number]  Encounter Provider: Ed Schreiber MD  Encounter Date: 3/14/2025   Encounter department: [facility]  :  Assessment & Plan  Vertigo  Will refer to vestibular therapy. Discussed supportive care and return parameters.   Orders:    Ambulatory Referral to Physical Therapy; Future    CBC and differential; Future    Comprehensive metabolic panel; Future    TSH, 3rd generation with Free T4 reflex; Future    Lipid Panel with Direct LDL reflex; Future    Hemoglobin A1C; Future    PSA, Total Screen; Future    IFG (impaired fasting glucose)  Check follow-up labs. Discussed supportive care and return parameters.   Orders:    CBC and differential; Future    Comprehensive metabolic panel; Future    TSH, 3rd generation with Free T4 reflex; Future    Lipid Panel with Direct LDL reflex; Future    Hemoglobin A1C; Future    PSA, Total Screen; Future           History of Present Illness   Patient is a 45 y/o male who presents c/o a week of dizziness and vertigo when he turns his head no fevers chills nausea or vomiting. Pt has h/o with IFG and due for follow-up labs.    Dizziness      Review of Systems   Constitutional: Negative.    HENT: Negative.     Eyes: Negative.    Respiratory: Negative.     Cardiovascular: Negative.    Gastrointestinal: Negative.    Endocrine: Negative.    Genitourinary: Negative.    Musculoskeletal: Negative.    Allergic/Immunologic: Negative.    Neurological:  Positive for dizziness.   Hematological: Negative.    Psychiatric/Behavioral: Negative.     All other systems reviewed and are negative.      Objective   /60 (BP Location: Left arm, Patient Position: Sitting, Cuff Size: Large)   Pulse 61   Temp 98.3 °F (36.8 °C) (Temporal)   Resp 16   Ht 6' (1.829 m)   Wt 88.5 kg (195 lb)   SpO2 98%   BMI 26.45 kg/m²      Physical Exam  Vitals reviewed.   Constitutional:       General: He is not in acute distress.      Appearance: He is well-developed. He is not diaphoretic.   HENT:      Head: Normocephalic and atraumatic.      Right Ear: External ear normal.      Left Ear: External ear normal.      Nose: Nose normal.   Eyes:      General: No scleral icterus.        Right eye: No discharge.         Left eye: No discharge.      Conjunctiva/sclera: Conjunctivae normal.      Pupils: Pupils are equal, round, and reactive to light.   Neck:      Thyroid: No thyromegaly.      Trachea: No tracheal deviation.   Cardiovascular:      Rate and Rhythm: Normal rate and regular rhythm.      Heart sounds: Normal heart sounds. No murmur heard.     No friction rub.   Pulmonary:      Effort: Pulmonary effort is normal. No respiratory distress.      Breath sounds: Normal breath sounds. No stridor. No wheezing or rales.   Abdominal:      General: There is no distension.      Palpations: Abdomen is soft. There is no mass.      Tenderness: There is no abdominal tenderness. There is no guarding or rebound.   Musculoskeletal:         General: Normal range of motion.      Cervical back: Normal range of motion and neck supple.   Lymphadenopathy:      Cervical: No cervical adenopathy.   Skin:     General: Skin is warm.   Neurological:      Mental Status: He is alert and oriented to person, place, and time.      Cranial Nerves: No cranial nerve deficit.   Psychiatric:         Behavior: Behavior normal.         Thought Content: Thought content normal.         Judgment: Judgment normal.

## 2025-03-17 NOTE — ASSESSMENT & PLAN NOTE
Check follow-up labs. Discussed supportive care and return parameters.   Orders:    CBC and differential; Future    Comprehensive metabolic panel; Future    TSH, 3rd generation with Free T4 reflex; Future    Lipid Panel with Direct LDL reflex; Future    Hemoglobin A1C; Future    PSA, Total Screen; Future

## 2025-03-26 ENCOUNTER — APPOINTMENT (OUTPATIENT)
Dept: LAB | Facility: HOSPITAL | Age: 45
End: 2025-03-26
Payer: MEDICARE

## 2025-03-26 DIAGNOSIS — R42 VERTIGO: ICD-10-CM

## 2025-03-26 DIAGNOSIS — R73.01 IFG (IMPAIRED FASTING GLUCOSE): ICD-10-CM

## 2025-03-26 LAB
ALBUMIN SERPL BCG-MCNC: 4.9 G/DL (ref 3.5–5)
ALP SERPL-CCNC: 90 U/L (ref 34–104)
ALT SERPL W P-5'-P-CCNC: 19 U/L (ref 7–52)
ANION GAP SERPL CALCULATED.3IONS-SCNC: 4 MMOL/L (ref 4–13)
AST SERPL W P-5'-P-CCNC: 19 U/L (ref 13–39)
BASOPHILS # BLD AUTO: 0.06 THOUSANDS/ÂΜL (ref 0–0.1)
BASOPHILS NFR BLD AUTO: 1 % (ref 0–1)
BILIRUB SERPL-MCNC: 0.51 MG/DL (ref 0.2–1)
BUN SERPL-MCNC: 16 MG/DL (ref 5–25)
CALCIUM SERPL-MCNC: 9.5 MG/DL (ref 8.4–10.2)
CHLORIDE SERPL-SCNC: 105 MMOL/L (ref 96–108)
CHOLEST SERPL-MCNC: 197 MG/DL (ref ?–200)
CO2 SERPL-SCNC: 30 MMOL/L (ref 21–32)
CREAT SERPL-MCNC: 1.27 MG/DL (ref 0.6–1.3)
EOSINOPHIL # BLD AUTO: 0.48 THOUSAND/ÂΜL (ref 0–0.61)
EOSINOPHIL NFR BLD AUTO: 8 % (ref 0–6)
ERYTHROCYTE [DISTWIDTH] IN BLOOD BY AUTOMATED COUNT: 13 % (ref 11.6–15.1)
EST. AVERAGE GLUCOSE BLD GHB EST-MCNC: 117 MG/DL
GFR SERPL CREATININE-BSD FRML MDRD: 68 ML/MIN/1.73SQ M
GLUCOSE P FAST SERPL-MCNC: 82 MG/DL (ref 65–99)
HBA1C MFR BLD: 5.7 %
HCT VFR BLD AUTO: 40.1 % (ref 36.5–49.3)
HDLC SERPL-MCNC: 43 MG/DL
HGB BLD-MCNC: 13.3 G/DL (ref 12–17)
IMM GRANULOCYTES # BLD AUTO: 0.01 THOUSAND/UL (ref 0–0.2)
IMM GRANULOCYTES NFR BLD AUTO: 0 % (ref 0–2)
LDLC SERPL CALC-MCNC: 138 MG/DL (ref 0–100)
LYMPHOCYTES # BLD AUTO: 2.47 THOUSANDS/ÂΜL (ref 0.6–4.47)
LYMPHOCYTES NFR BLD AUTO: 39 % (ref 14–44)
MCH RBC QN AUTO: 31.3 PG (ref 26.8–34.3)
MCHC RBC AUTO-ENTMCNC: 33.2 G/DL (ref 31.4–37.4)
MCV RBC AUTO: 94 FL (ref 82–98)
MONOCYTES # BLD AUTO: 0.47 THOUSAND/ÂΜL (ref 0.17–1.22)
MONOCYTES NFR BLD AUTO: 7 % (ref 4–12)
NEUTROPHILS # BLD AUTO: 2.85 THOUSANDS/ÂΜL (ref 1.85–7.62)
NEUTS SEG NFR BLD AUTO: 45 % (ref 43–75)
NRBC BLD AUTO-RTO: 0 /100 WBCS
PLATELET # BLD AUTO: 249 THOUSANDS/UL (ref 149–390)
PMV BLD AUTO: 9.9 FL (ref 8.9–12.7)
POTASSIUM SERPL-SCNC: 4.5 MMOL/L (ref 3.5–5.3)
PROT SERPL-MCNC: 7.6 G/DL (ref 6.4–8.4)
PSA SERPL-MCNC: 1.33 NG/ML (ref 0–4)
RBC # BLD AUTO: 4.25 MILLION/UL (ref 3.88–5.62)
SODIUM SERPL-SCNC: 139 MMOL/L (ref 135–147)
TRIGL SERPL-MCNC: 78 MG/DL (ref ?–150)
TSH SERPL DL<=0.05 MIU/L-ACNC: 1.14 UIU/ML (ref 0.45–4.5)
WBC # BLD AUTO: 6.34 THOUSAND/UL (ref 4.31–10.16)

## 2025-03-26 PROCEDURE — 84443 ASSAY THYROID STIM HORMONE: CPT

## 2025-03-26 PROCEDURE — 83036 HEMOGLOBIN GLYCOSYLATED A1C: CPT

## 2025-03-26 PROCEDURE — 80061 LIPID PANEL: CPT

## 2025-03-26 PROCEDURE — 36415 COLL VENOUS BLD VENIPUNCTURE: CPT

## 2025-03-26 PROCEDURE — 85025 COMPLETE CBC W/AUTO DIFF WBC: CPT

## 2025-03-26 PROCEDURE — 80053 COMPREHEN METABOLIC PANEL: CPT

## 2025-03-26 PROCEDURE — G0103 PSA SCREENING: HCPCS

## 2025-03-27 ENCOUNTER — RESULTS FOLLOW-UP (OUTPATIENT)
Age: 45
End: 2025-03-27

## 2025-03-27 NOTE — RESULT ENCOUNTER NOTE
Please call patient. Labs are fairly stable, though cholesterol worsened, sugar is borderline, recommend healthy diet like the mediterranean diet, exercise. Will discuss more fully at next visit.

## 2025-04-02 ENCOUNTER — OFFICE VISIT (OUTPATIENT)
Age: 45
End: 2025-04-02
Payer: MEDICARE

## 2025-04-02 ENCOUNTER — TELEPHONE (OUTPATIENT)
Age: 45
End: 2025-04-02

## 2025-04-02 VITALS
TEMPERATURE: 97.6 F | DIASTOLIC BLOOD PRESSURE: 72 MMHG | SYSTOLIC BLOOD PRESSURE: 110 MMHG | BODY MASS INDEX: 26.01 KG/M2 | RESPIRATION RATE: 16 BRPM | HEIGHT: 72 IN | OXYGEN SATURATION: 100 % | WEIGHT: 192 LBS | HEART RATE: 56 BPM

## 2025-04-02 DIAGNOSIS — F90.0 ATTENTION DEFICIT HYPERACTIVITY DISORDER (ADHD), PREDOMINANTLY INATTENTIVE TYPE: ICD-10-CM

## 2025-04-02 DIAGNOSIS — Z00.00 ANNUAL PHYSICAL EXAM: Primary | ICD-10-CM

## 2025-04-02 DIAGNOSIS — F32.9 REACTIVE DEPRESSION (SITUATIONAL): ICD-10-CM

## 2025-04-02 DIAGNOSIS — E55.9 VITAMIN D DEFICIENCY: ICD-10-CM

## 2025-04-02 DIAGNOSIS — F41.9 ANXIETY: ICD-10-CM

## 2025-04-02 PROCEDURE — 99396 PREV VISIT EST AGE 40-64: CPT | Performed by: FAMILY MEDICINE

## 2025-04-02 RX ORDER — DEXTROAMPHETAMINE SACCHARATE, AMPHETAMINE ASPARTATE MONOHYDRATE, DEXTROAMPHETAMINE SULFATE AND AMPHETAMINE SULFATE 6.25; 6.25; 6.25; 6.25 MG/1; MG/1; MG/1; MG/1
25 CAPSULE, EXTENDED RELEASE ORAL EVERY MORNING
Qty: 30 CAPSULE | Refills: 0 | Status: SHIPPED | OUTPATIENT
Start: 2025-04-02

## 2025-04-02 RX ORDER — ERGOCALCIFEROL 1.25 MG/1
50000 CAPSULE, LIQUID FILLED ORAL WEEKLY
Qty: 12 CAPSULE | Refills: 1 | Status: SHIPPED | OUTPATIENT
Start: 2025-04-02

## 2025-04-02 RX ORDER — DEXTROAMPHETAMINE SACCHARATE, AMPHETAMINE ASPARTATE, DEXTROAMPHETAMINE SULFATE AND AMPHETAMINE SULFATE 1.25; 1.25; 1.25; 1.25 MG/1; MG/1; MG/1; MG/1
5 TABLET ORAL DAILY
Qty: 30 TABLET | Refills: 0 | Status: SHIPPED | OUTPATIENT
Start: 2025-04-02

## 2025-04-02 RX ORDER — ESCITALOPRAM OXALATE 10 MG/1
10 TABLET ORAL DAILY
Qty: 30 TABLET | Refills: 5 | Status: SHIPPED | OUTPATIENT
Start: 2025-04-02

## 2025-04-02 NOTE — PATIENT INSTRUCTIONS
"Patient Education     Routine physical for adults   The Basics   Written by the doctors and editors at [facility]   What is a physical? -- A physical is a routine visit, or \"check-up,\" with your doctor. You might also hear it called a \"wellness visit\" or \"preventive visit.\"  During each visit, the doctor will:   Ask about your physical and mental health   Ask about your habits, behaviors, and lifestyle   Do an exam   Give you vaccines if needed   Talk to you about any medicines you take   Give advice about your health   Answer your questions  Getting regular check-ups is an important part of taking care of your health. It can help your doctor find and treat any problems you have. But it's also important for preventing health problems.  A routine physical is different from a \"sick visit.\" A sick visit is when you see a doctor because of a health concern or problem. Since physicals are scheduled ahead of time, you can think about what you want to ask the doctor.  How often should I get a physical? -- It depends on your age and health. In general, for people age 21 years and older:   If you are younger than 50 years, you might be able to get a physical every 3 years.   If you are 50 years or older, your doctor might recommend a physical every year.  If you have an ongoing health condition, like diabetes or high blood pressure, your doctor will probably want to see you more often.  What happens during a physical? -- In general, each visit will include:   Physical exam - The doctor or nurse will check your height, weight, heart rate, and blood pressure. They will also look at your eyes and ears. They will ask about how you are feeling and whether you have any symptoms that bother you.   Medicines - It's a good idea to bring a list of all the medicines you take to each doctor visit. Your doctor will talk to you about your medicines and answer any questions. Tell them if you are having any side effects that bother you. You " "should also tell them if you are having trouble paying for any of your medicines.   Habits and behaviors - This includes:   Your diet   Your exercise habits   Whether you smoke, drink alcohol, or use drugs   Whether you are sexually active   Whether you feel safe at home  Your doctor will talk to you about things you can do to improve your health and lower your risk of health problems. They will also offer help and support. For example, if you want to quit smoking, they can give you advice and might prescribe medicines. If you want to improve your diet or get more physical activity, they can help you with this, too.   Lab tests, if needed - The tests you get will depend on your age and situation. For example, your doctor might want to check your:   Cholesterol   Blood sugar   Iron level   Vaccines - The recommended vaccines will depend on your age, health, and what vaccines you already had. Vaccines are very important because they can prevent certain serious or deadly infections.   Discussion of screening - \"Screening\" means checking for diseases or other health problems before they cause symptoms. Your doctor can recommend screening based on your age, risk, and preferences. This might include tests to check for:   Cancer, such as breast, prostate, cervical, ovarian, colorectal, prostate, lung, or skin cancer   Sexually transmitted infections, such as chlamydia and gonorrhea   Mental health conditions like depression and anxiety  Your doctor will talk to you about the different types of screening tests. They can help you decide which screenings to have. They can also explain what the results might mean.   Answering questions - The physical is a good time to ask the doctor or nurse questions about your health. If needed, they can refer you to other doctors or specialists, too.  Adults older than 65 years often need other care, too. As you get older, your doctor will talk to you about:   How to prevent falling at " home   Hearing or vision tests   Memory testing   How to take your medicines safely   Making sure that you have the help and support you need at home  All topics are updated as new evidence becomes available and our peer review process is complete.  This topic retrieved from [employer] on: May 02, 2024.  Topic 972014 Version 1.0  Release: 32.4.3 - C32.122  © 2024 UpToDate, Inc. and/or its affiliates. All rights reserved.  Consumer Information Use and Disclaimer   Disclaimer: This generalized information is a limited summary of diagnosis, treatment, and/or medication information. It is not meant to be comprehensive and should be used as a tool to help the user understand and/or assess potential diagnostic and treatment options. It does NOT include all information about conditions, treatments, medications, side effects, or risks that may apply to a specific patient. It is not intended to be medical advice or a substitute for the medical advice, diagnosis, or treatment of a health care provider based on the health care provider's examination and assessment of a patient's specific and unique circumstances. Patients must speak with a health care provider for complete information about their health, medical questions, and treatment options, including any risks or benefits regarding use of medications. This information does not endorse any treatments or medications as safe, effective, or approved for treating a specific patient. UpToDate, Inc. and its affiliates disclaim any warranty or liability relating to this information or the use thereof.The use of this information is governed by the Terms of Use, available at https://www.woltersMonkey Biznessuwer.com/en/know/clinical-effectiveness-terms. 2024© UpToDate, Inc. and its affiliates and/or licensors. All rights reserved.  Copyright   © 2024 UpToDate, Inc. and/or its affiliates. All rights reserved.    Patient Education     Diet and health   The Basics   Written by the doctors and  "editors at [employer]   Why is it important to eat a healthy diet? -- It's important to eat a healthy diet because eating the right foods can keep you healthy now and later in life. It can lower the risk of problems like heart disease, diabetes, high blood pressure, and some types of cancer. It can also help you live longer and improve your quality of life.  What kind of diet is best? -- There is no 1 specific diet that experts recommend for everyone. People choose what foods to eat for many different reasons. These include personal preference, culture, Evangel[employer], allergies or intolerances, and nutritional goals. People also need to consider the cost and availability of different foods.  In general, experts recommend a diet that:   Includes lots of vegetables, fruits, beans, nuts, and whole grains   Limits red and processed meats, unhealthy fats, sugar, salt, and alcohol  What are dietary patterns? -- A dietary \"pattern\" means generally eating certain types of foods while limiting others. Some people need to follow a specific dietary pattern because of their health needs. For example, if you have high blood pressure, your doctor might recommend a diet low in salt.  If you are trying to improve your health in general, choosing a healthy dietary pattern can help. This does not have to mean being very strict about what you eat or avoid. The goal is to think about getting plenty of healthy foods while limiting less healthy foods.  Examples of dietary patterns include:   Mediterranean diet - This involves eating a lot of fruits, vegetables, nuts, and whole grains, and uses olive oil instead of other fats. It also includes some fish, poultry, and dairy products, but not a lot of red meat. Following this diet can help your overall health, and might even lower your risk of having a stroke.   Plant-based diets - These patterns focus on vegetables, fruits, grains, beans, and nuts. They limit or avoid food that comes from " "animals, such as meat and dairy. There are different types of plant-based diets, including vegetarian and vegan.   Low-fat diet - A low-fat diet involves limiting calories from fat. This might help some people keep weight off if that is their goal, but it does not have many other health benefits. If you choose to follow a low-fat diet, it is also important to focus on getting lots of whole grains, legumes, fruits, and vegetables. Limit refined grains and sugar.   Low-cholesterol diet - Cholesterol is found in foods with a lot of saturated fat, like red meat, butter, and cheese. A low-cholesterol diet focuses on limiting the amount of cholesterol that you eat. Limiting the cholesterol in your diet can also help lower the amount of unhealthy fats that you eat.  Which foods are especially healthy? -- Foods that are especially healthy include:   Fruits and vegetables - Eating a diet with lots of fruits and vegetables can help prevent heart disease and stroke. It might also help prevent certain types of cancer. Try to eat fruits and vegetables at each meal and also for snacks. If you don't have fresh fruits and vegetables available, you can eat frozen or canned ones instead. Doctors recommend eating at least 5 servings of fruits or vegetables each day.   Whole grains - Whole-grain foods include 100 percent whole-wheat bread, steel cut oats, and whole-grain pasta. These are healthier than foods made with \"refined\" grains, like white bread and white rice. Eating lots of whole grains instead of refined grains has been shown to help with weight control. It can also lower the risk of several health problems, including colon cancer, heart disease, and diabetes. Doctors recommend that most people try to eat 5 to 8 servings of whole-grain, high-fiber foods each day.   Foods with fiber - Eating foods with a lot of fiber can help prevent heart disease and stroke. If you have type 2 diabetes, it can also help control your blood " "sugar. Foods that have a lot of fiber include vegetables, fruits, beans, nuts, oatmeal, and whole-grain breads and cereals. You can tell how much fiber is in a food by reading the nutrition label (figure 1). Doctors recommend that most people eat about 25 to 34 grams of fiber each day.   Foods with calcium and vitamin D - Babies, children, and adults need calcium and vitamin D to help keep their bones strong. Adults also need calcium and vitamin D to help prevent osteoporosis. Osteoporosis is a condition that causes bones to get \"thin\" and break more easily than usual. Different foods and drinks have calcium and vitamin D in them (figure 2). People who don't get enough calcium and vitamin D in their diet might need to take a supplement. Doctors recommend that most people have 2 to 3 servings of foods with calcium and vitamin D each day.   Foods with protein - Protein helps your muscles and bones stay strong. Healthy foods with a lot of protein include chicken, fish, eggs, beans, nuts, and soy products. Red meat also has a lot of protein, but it also contains fats, which can be unhealthy. Doctors recommend that most people try to eat about 5 servings of protein each day.   Healthy fats - There are different types of fats. Some types of fats are better for your body than others. Healthy fats are \"monounsaturated\" or \"polyunsaturated\" fats. These are found in fatty fish, nuts and nut butters, and avocados. Use plant-based oils when cooking. Examples of these oils include olive, canola, safflower, sunflower, and corn oil. Eating foods with healthy fats, while avoiding or limiting foods with unhealthy fats, might lower the risk of heart disease.   Foods with folate - Folate is a vitamin that is important for pregnant people, since it helps prevent certain birth defects. It is also called \"folic acid.\" Anyone who could get pregnant should get at least 400 micrograms of folic acid daily, whether or not they are actively " "trying to get pregnant. Folate is found in many breakfast cereals, oranges, orange juice, and green leafy vegetables.  What foods should I avoid or limit? -- To eat a healthy diet, there are some things that you should avoid or limit. They include:   Unhealthy fats - \"Trans\" fats are especially unhealthy. They are found in margarines, many fast foods, and some store-bought baked goods. \"Saturated\" fats are found in animal products like meats, egg yolks, butter, cheese, and full-fat milk products. Unhealthy fats can raise your cholesterol level and increase your chance of getting heart disease.   Sugar - To have a healthy diet, it's important to limit or avoid added sugar, sweets, and refined grains. Refined grains are found in white bread, white rice, most pastas, and most packaged \"snack\" foods.  Avoiding sugar-sweetened beverages, like soda and sports drinks, can also help improve your health.  Avoid canned fruits in \"heavy\" syrup.   Red and processed meats - Studies have shown that eating a lot of red meat can increase your risk of certain health problems, including heart disease and cancer. You should limit the amount of red meat that you eat. This is also true for processed meats like sausage, hot dogs, and woodson.  Can I drink alcohol as part of a healthy diet? -- Not drinking alcohol at all is the healthiest choice. Regular drinking can raise a person's chances of getting liver disease and certain types of cancers. In females, even 1 drink a day can increase the risk of getting breast cancer.  If you do choose to drink, most doctors recommend limiting alcohol to no more than:   1 drink a day for females   2 drinks a day for males  The limits are different because, generally, the female body takes longer to break down alcohol.  How many calories do I need each day? -- Calories give your body energy. The number of calories that you need each day depends on your weight, height, age, sex, and how active you " "are.  Your doctor or nurse can tell you about how many calories you should eat each day. You can also work with a dietitian (nutrition expert) to learn more about your dietary needs and options.  What if I am having trouble improving my diet? -- It can be hard to change the way that you eat. Remember that even small changes can improve your health.  Here are some tips that might help:   Try to make fruits and vegetables part of every meal. If you don't have fresh fruits and vegetables, frozen or canned are good options. Look for products without added salt or sugar.   Keep a bowl of fruit out for snacking.   When you can, choose whole grains instead of refined grains. Choose chicken, fish, and beans instead of red meat and cheese.   Try to eat prepared and processed foods less often.   Try flavored seltzer or water instead of soda or juice.   When eating at fast food restaurants, look for healthier items, like broiled chicken or salad.  If you have questions about which foods you should or should not eat, ask your doctor, nurse, or dietitian. The right diet for you will depend, in part, on your health and any medical conditions you have.  All topics are updated as new evidence becomes available and our peer review process is complete.  This topic retrieved from [employer] on: Feb 28, 2024.  Topic 78463 Version 28.0  Release: 32.2.4 - C32.58  © 2024 UpToDate, Inc. and/or its affiliates. All rights reserved.  figure 1: Nutrition label - Fiber     This is an example of a nutrition label. To figure out how much fiber is in a food, look for the line that says \"Dietary Fiber.\" It's also important to look at the serving size. This food has 7 grams of fiber in each serving, and each serving is 1 cup.  Graphic 31778 Version 8.0  figure 2: Foods and drinks with calcium and vitamin D     Foods rich in calcium include ice cream, soy milk, breads, kale, broccoli, milk, cheese, cottage cheese, almonds, yogurt, ready-to-eat cereals, " "beans, and tofu. Foods rich in vitamin D include milk, fortified plant-based \"milks\" (soy, almond), canned tuna fish, cod liver oil, yogurt, ready-to-eat-cereals, cooked salmon, canned sardines, mackerel, and eggs. Some of these foods are rich in both.  Graphic 62377 Version 4.0  Consumer Information Use and Disclaimer   Disclaimer: This generalized information is a limited summary of diagnosis, treatment, and/or medication information. It is not meant to be comprehensive and should be used as a tool to help the user understand and/or assess potential diagnostic and treatment options. It does NOT include all information about conditions, treatments, medications, side effects, or risks that may apply to a specific patient. It is not intended to be medical advice or a substitute for the medical advice, diagnosis, or treatment of a health care provider based on the health care provider's examination and assessment of a patient's specific and unique circumstances. Patients must speak with a health care provider for complete information about their health, medical questions, and treatment options, including any risks or benefits regarding use of medications. This information does not endorse any treatments or medications as safe, effective, or approved for treating a specific patient. UpToDate, Inc. and its affiliates disclaim any warranty or liability relating to this information or the use thereof.The use of this information is governed by the Terms of Use, available at https://www.woltersBumpTopuwer.com/en/know/clinical-effectiveness-terms. 2024© UpToDate, Inc. and its affiliates and/or licensors. All rights reserved.  Copyright   © 2024 UpToDate, Inc. and/or its affiliates. All rights reserved.    "

## 2025-04-02 NOTE — TELEPHONE ENCOUNTER
PA for ADDERALL XR, 25MG,) 25 MG SUBMITTED to     via    [x]CMM-KEY: [id number]  []Surescripts-Case ID #   []Availity-Auth ID # NDC #   []Faxed to plan   []Other website   []Phone call Case ID #     [x]PA sent as URGENT    All office notes, labs and other pertaining documents and studies sent. Clinical questions answered. Awaiting determination from insurance company.     Turnaround time for your insurance to make a decision on your Prior Authorization can take 7-21 business days.

## 2025-04-02 NOTE — PROGRESS NOTES
Adult Annual Physical  Name: Gwen Wright      : 1980      MRN: [id number]  Encounter Provider: Eulalio Ferrari DO  Encounter Date: 2025   Encounter department: [facility]    :  Assessment & Plan  Attention deficit hyperactivity disorder (ADHD), predominantly inattentive type    Orders:  •  amphetamine-dextroamphetamine (ADDERALL XR, 25MG,) 25 MG 24 hr capsule; Take 1 capsule (25 mg total) by mouth every morning Max Daily Amount: 25 mg  •  amphetamine-dextroamphetamine (ADDERALL, 5MG,) 5 MG tablet; Take 1 tablet (5 mg total) by mouth daily Max Daily Amount: 5 mg    Anxiety    Orders:  •  escitalopram (LEXAPRO) 10 mg tablet; Take 1 tablet (10 mg total) by mouth daily    Reactive depression (situational)  Depression Screening Follow-up Plan: Patient's depression screening was positive with a PHQ-9 score of 9. Patient with underlying depression and was advised to continue current medications as prescribed. Patient assessed for underlying major depression. They have no active suicidal ideations. Brief counseling provided and recommend additional follow-up/re-evaluation next office visit.    Orders:  •  escitalopram (LEXAPRO) 10 mg tablet; Take 1 tablet (10 mg total) by mouth daily    Vitamin D deficiency    Orders:  •  ergocalciferol (VITAMIN D2) 50,000 units; Take 1 capsule (50,000 Units total) by mouth once a week    Annual physical exam         BMI 26.0-26.9,adult         Attention deficit hyperactivity disorder (ADHD), predominantly inattentive type         Anxiety         Reactive depression (situational)  Depression Screening Follow-up Plan: Patient's depression screening was positive with a PHQ-9 score of 9. Patient with underlying depression and was advised to continue current medications as prescribed. Patient assessed for underlying major depression. They have no active suicidal ideations. Brief counseling provided and recommend additional follow-up/re-evaluation next  office visit.       Vitamin D deficiency         Annual physical exam         BMI 26.0-26.9,adult               Preventive Screenings:  - Diabetes Screening: screening up-to-date  - Cholesterol Screening: screening up-to-date   - Hepatitis C screening: screening up-to-date   - HIV screening: screening up-to-date   - Colon cancer screening: screening not indicated   - Lung cancer screening: screening not indicated   - Prostate cancer screening: screening not indicated     Immunizations:  - Immunizations due: Influenza, Prevnar 20 and Tdap      Depression Screening and Follow-up Plan: Patient's depression screening was positive with a PHQ-9 score of 9.   Patient assessed for underlying major depression. Brief counseling provided and recommend additional follow-up/re-evaluation next office visit. Patient with underlying depression and was advised to continue current medications as prescribed.     Tobacco Cessation Counseling: Tobacco cessation counseling was provided. The patient is sincerely urged to quit consumption of tobacco. He is ready to quit tobacco.         History of Present Illness     Adult Annual Physical:  Patient presents for annual physical.     Diet and Physical Activity:  - Diet/Nutrition: well balanced diet.  - Exercise: no formal exercise.    Depression Screening:    - PHQ-9 Score: 9    General Health:  - Sleep: sleeps poorly.  - Hearing: normal hearing right ear.  - Vision: no vision problems.  - Dental: regular dental visits.     Health:  - History of STDs: no.   - Urinary symptoms: none.     Advanced Care Planning:  - Has an advanced directive?: no    - Has a durable medical POA?: no    - ACP document given to patient?: no      Review of Systems   Constitutional: Negative.    HENT: Negative.     Eyes: Negative.    Respiratory: Negative.     Cardiovascular: Negative.    Gastrointestinal: Negative.    Endocrine: Negative.    Genitourinary: Negative.    Musculoskeletal:  Positive for arthralgias  and joint swelling.   Skin: Negative.    Allergic/Immunologic: Negative.    Neurological:  Positive for dizziness. Negative for syncope.   Hematological: Negative.    Psychiatric/Behavioral: Negative.     All other systems reviewed and are negative.    Pertinent Medical History   Past Medical History:   Diagnosis Date   • ADHD    • Allergic    • Anxiety    • Depression    • GERD (gastroesophageal reflux disease)    • Hyperlipemia                Objective   /72 (BP Location: Left arm, Patient Position: Sitting, Cuff Size: Large)   Pulse 56   Temp 97.6 °F (36.4 °C) (Temporal)   Resp 16   Ht 6' (1.829 m)   Wt 87.1 kg (192 lb)   SpO2 100%   BMI 26.04 kg/m²     Physical Exam  Vitals and nursing note reviewed.   Constitutional:       Appearance: Normal appearance. He is well-developed.   HENT:      Head: Normocephalic.      Nose: Nose normal.   Eyes:      Conjunctiva/sclera: Conjunctivae normal.      Pupils: Pupils are equal, round, and reactive to light.   Cardiovascular:      Rate and Rhythm: Normal rate and regular rhythm.      Pulses: Normal pulses.      Heart sounds: Normal heart sounds.   Pulmonary:      Effort: Pulmonary effort is normal.      Breath sounds: Normal breath sounds.   Abdominal:      General: Abdomen is flat. Bowel sounds are normal.      Palpations: Abdomen is soft.   Musculoskeletal:      Cervical back: Normal range of motion and neck supple.   Skin:     General: Skin is warm and dry.   Neurological:      General: No focal deficit present.      Mental Status: He is alert and oriented to person, place, and time.   Psychiatric:         Mood and Affect: Mood normal.         Behavior: Behavior normal.         Thought Content: Thought content normal.         Judgment: Judgment normal.

## 2025-04-02 NOTE — TELEPHONE ENCOUNTER
Reason for call:   [x] Prior Auth  [] Other:     Caller:  [x] Patient  [] Pharmacy  Name:   Address:   Callback Number:     Medication: amphetamine-dextroamphetamine (ADDERALL XR, 25MG,) 25 MG 24 hr capsule    Dose/Frequency: Take 1 capsule (25 mg total) by mouth every morning Max Daily Amount: 25 mg     Quantity: 30 capsule     Ordering Provider:   [x] PCP/Provider -  Eulalio Ferrari, DO   [] Speciality/Provider -     Has the patient tried other medications and failed? If failed, which medications did they fail?    [] No   [] Yes -     Is the patient's insurance updated in EPIC?   [x] Yes   [] No     Is a copy of the patient's insurance scanned in EPIC?   [x] Yes   [] No         Reason for call:   [] Prior Auth  [] Other:     Caller:  [] Patient  [] Pharmacy  Name:   Address:   Callback Number:     Medication:  amphetamine-dextroamphetamine (ADDERALL, 5MG,) 5 MG tablet    Dose/Frequency: Take 1 tablet (5 mg total) by mouth daily Max Daily Amount: 5 mg    Quantity: 30 tablet     Ordering Provider:   [x] PCP/Provider -  Eulalio Ferrari, DO   [] Speciality/Provider -     Has the patient tried other medications and failed? If failed, which medications did they fail?    [] No   [] Yes -     Is the patient's insurance updated in EPIC?   [x] Yes   [] No     Is a copy of the patient's insurance scanned in EPIC?   [x] Yes   [] No

## 2025-04-10 NOTE — TELEPHONE ENCOUNTER
Spoke with Violeta at [facility] is going to fax form to jordy out and send back

## 2025-04-11 NOTE — TELEPHONE ENCOUNTER
Received call from [facility] regarding [place] - Adderal   Requesting additional clinical information- advised to send request via fax   Provided fax number

## 2025-05-06 ENCOUNTER — TELEPHONE (OUTPATIENT)
Dept: PSYCHIATRY | Facility: CLINIC | Age: 45
End: 2025-05-06

## 2025-05-06 NOTE — TELEPHONE ENCOUNTER
Received call from POD with patient looking to schedule ERICK as previous provider left. Scheduled patient for ERICK 7/31 930AM in office.    Patient asked about refills for medications as he has not had them in two months. When reviewing medications patient mentioned he saw his PCP and scripts were sent but the pharmacy had to contact the office and he never heard back.    Received chart and found prior auth that was requested on 4/2 and completed on 4/14 for one medication. Advised patient to call the pharmacy to inquire about filling scripts as prior auth is complete. Patient understood and will call to speak with clinical staff if there are further issues.

## 2025-06-26 DIAGNOSIS — E55.9 VITAMIN D DEFICIENCY: ICD-10-CM

## 2025-06-26 DIAGNOSIS — F41.9 ANXIETY: ICD-10-CM

## 2025-06-26 DIAGNOSIS — F32.9 REACTIVE DEPRESSION (SITUATIONAL): ICD-10-CM

## 2025-06-26 DIAGNOSIS — F90.0 ATTENTION DEFICIT HYPERACTIVITY DISORDER (ADHD), PREDOMINANTLY INATTENTIVE TYPE: ICD-10-CM

## 2025-06-26 RX ORDER — DEXTROAMPHETAMINE SACCHARATE, AMPHETAMINE ASPARTATE MONOHYDRATE, DEXTROAMPHETAMINE SULFATE AND AMPHETAMINE SULFATE 6.25; 6.25; 6.25; 6.25 MG/1; MG/1; MG/1; MG/1
25 CAPSULE, EXTENDED RELEASE ORAL EVERY MORNING
Qty: 30 CAPSULE | Refills: 0 | Status: SHIPPED | OUTPATIENT
Start: 2025-06-26

## 2025-06-26 RX ORDER — ESCITALOPRAM OXALATE 10 MG/1
10 TABLET ORAL DAILY
Qty: 30 TABLET | Refills: 5 | Status: SHIPPED | OUTPATIENT
Start: 2025-06-26

## 2025-06-26 RX ORDER — DEXTROAMPHETAMINE SACCHARATE, AMPHETAMINE ASPARTATE, DEXTROAMPHETAMINE SULFATE AND AMPHETAMINE SULFATE 1.25; 1.25; 1.25; 1.25 MG/1; MG/1; MG/1; MG/1
5 TABLET ORAL DAILY
Qty: 30 TABLET | Refills: 0 | Status: SHIPPED | OUTPATIENT
Start: 2025-06-26

## 2025-06-26 RX ORDER — ERGOCALCIFEROL 1.25 MG/1
50000 CAPSULE, LIQUID FILLED ORAL WEEKLY
Qty: 12 CAPSULE | Refills: 0 | Status: SHIPPED | OUTPATIENT
Start: 2025-06-26

## 2025-06-26 NOTE — TELEPHONE ENCOUNTER
*POSSIBLE SCRIPT CHANGE*  Please determine if script should still be weekly or if pt should be on a daily dose. Vitamin D has not been checked since 2023    Reason for call:   [x] Refill   [] Prior Auth  [] Other:     Office:   [x] [facility]   [] Specialty/Provider -     Medication:   ~ ergocalciferol (VITAMIN D2) 50,000 units - Take 1 capsule (50,000 Units total) by mouth once a week     Pharmacy:   [facility]/pharmacy #[id number] - [place], [place] - [address]   Does the patient have enough for 3 days?   [] Yes   [x] No - Send as HP to POD

## 2025-06-26 NOTE — TELEPHONE ENCOUNTER
Pt requesting Psych take over    Reason for call:   [x] Refill   [] Prior Auth  [] Other:     Office:   [] PCP/Provider -   [x] Specialty/Provider - [facility]    Medication:   ~ amphetamine-dextroamphetamine (ADDERALL XR, 25MG,) 25 MG 24 hr capsule - Take 1 capsule (25 mg total) by mouth every morning Max Daily Amount: 25 mg   ~ amphetamine-dextroamphetamine (ADDERALL, 5MG,) 5 MG tablet - Take 1 tablet (5 mg total) by mouth daily Max Daily Amount: 5 mg   ~ escitalopram (LEXAPRO) 10 mg tablet - Take 1 tablet (10 mg total) by mouth daily     Pharmacy:   [facility]/pharmacy #[id number] - [place], [place] - [address]   Does the patient have enough for 3 days?   [] Yes   [x] No - Send as HP to POD

## 2025-06-26 NOTE — TELEPHONE ENCOUNTER
Call [phone] to schedule Mammogram.  Healthy diet. Stay active. Return for earwax removal bilateral ears with hydrogen peroxide for 4 days before that visit.     [address] SCREENING SCHEDULE   Tests on this list are recommended by your physician Last office visit 4/02/25 and no upcoming visit.    Men who are 73-68 years old and have smoked more than 100 cigarettes in their lifetime   • Anyone with a family history    Colorectal Cancer Screening  Covered up to Age 76     Colonoscopy Screen   Covered every 10 years- more often if abnormal Colonoscopy performed in visit on 12/28/15   • INFLUENZA VIRUS VACCINE, PRESERV FREE, >=1YEARS OF AGE    Please get every year    Pneumococcal 13 (Prevnar)  Covered Once after 65 Orders placed or performed in visit on 12/28/15   • PNEUMOCOCCAL VACC, 13 ELOINA IM    Plea Directives.

## 2025-06-26 NOTE — TELEPHONE ENCOUNTER
Refills cannot be delegated. Previously being prescribed by PCP, patient wasn't psych to take over     1 [id number] 05/08/2025 05/07/2025 04/02/2025 Mixed Amphetamine Salts (Capsule, Extended Release) 30.0 30 25 MG NA [employer]. [place] / 0 [place]   1 [id number] 05/08/2025 05/06/2025 04/02/2025 Amphetamine Salt Combo (Tablet) 30.0 30 5 MG NA [employer]. [place] / 0 [place]   1 [id number] 01/21/2025 01/14/2025 01/13/2025 Mixed Amphetamine Salts (Capsule, Extended Release) 30.0 30 25 MG NA [employer]. [place] 0 / 0 [place]   1 [id number] 01/21/2025 01/13/2025 01/13/2025 Amphetamine Salt Combo (Tablet) 30.0 30 5 MG NA [employer]. Private [place] 0 / 0 [place]   1 [id number] 12/27/2024 12/27/2024 12/27/2024 HYDROcodone BITARTRATE 5 MG ORAL TABLET/ACETAMINOPHEN 325 MG (Tablet) 9.0 2 325 MG-5 MG 22.50 KIRSTIN ANDIE [employer]. [place] 0 / 0 [place]   1 [id number] 11/19/2024 11/18/2024 11/18/2024 Dextroamph Sacc-amph Asp-dextroam S (Capsule, Extended Release) 30.0 30 25 MG NA MRO, [employer]. [place] 0 / 0 [place]   1 [id number] 11/19/2024 11/18/2024 11/18/2024 Amphetamine Salt Combo (Tablet) 30.0 30 5 MG NA [employer]. [place] 0 / 0 [place]   1 [id number] 10/14/2024 10/14/2024 10/14/2024 Amphetamine Salt Combo (Tablet) 30.0 30 5 MG NA MRO, [employer]. [place] 0 / 0 [place]   1 [id number] 10/14/2024 10/14/2024 10/14/2024 Mixed Amphetamine Salts (Capsule, Extended Release) 30.0 30 25 MG NA [employer] [place] 0 / 0 [place]   1 [id number] 07/18/2024 07/18/2024 07/18/2024 Amphetamine Salt Combo (Tablet) 30.0 30 5 MG NA [employer] Commercial Insurance 0 / 0 [place]

## 2025-06-27 ENCOUNTER — TELEPHONE (OUTPATIENT)
Age: 45
End: 2025-06-27

## 2025-06-27 NOTE — TELEPHONE ENCOUNTER
PA for Adderall XR, 25 mg SUBMITTED to [employer]     via    [x]CMM-KEY: [id number]  []Surescripts-Case ID #   []Availity-Auth ID # NDC #   []Faxed to plan   []Other website   []Phone call Case ID #     []PA sent as URGENT    All office notes, labs and other pertaining documents and studies sent. Clinical questions answered. Awaiting determination from insurance company.     Turnaround time for your insurance to make a decision on your Prior Authorization can take 7-21 business days.

## 2025-06-27 NOTE — TELEPHONE ENCOUNTER
Patient called and stated the pharmacy, [facility], said he needs a prior authorization for Adderall 5mg and 10mg prescribed medications. Writer will forward for review.

## 2025-06-27 NOTE — TELEPHONE ENCOUNTER
PA for Adderall 5 mg SUBMITTED to [employer]     via    [x]CMM-KEY: [id number]  []Surescripts-Case ID #   []Availity-Auth ID # NDC #   []Faxed to plan   []Other website   []Phone call Case ID #     []PA sent as URGENT    All office notes, labs and other pertaining documents and studies sent. Clinical questions answered. Awaiting determination from insurance company.     Turnaround time for your insurance to make a decision on your Prior Authorization can take 7-21 business days.

## 2025-06-30 NOTE — TELEPHONE ENCOUNTER
PA for amphetamine-dextroamphetamine 25 mg capsule APPROVED     Date(s) approved 6/28/2025-6/28/2026    Case #    Patient advised by          [x]MyChart Message  []Phone call   [x]LMOM  []L/M to call office as no active Communication consent on file  []Unable to leave detailed message as VM not approved on Communication consent       Pharmacy advised by    [x]Fax  []Phone call  []Secure Chat    Specialty Pharmacy    []     Approval letter scanned into Media Yes

## 2025-07-31 ENCOUNTER — APPOINTMENT (OUTPATIENT)
Dept: RADIOLOGY | Facility: MEDICAL CENTER | Age: 45
End: 2025-07-31
Attending: EMERGENCY MEDICINE
Payer: MEDICARE

## 2025-07-31 ENCOUNTER — OFFICE VISIT (OUTPATIENT)
Dept: PSYCHIATRY | Facility: CLINIC | Age: 45
End: 2025-07-31
Payer: COMMERCIAL

## 2025-07-31 VITALS — WEIGHT: 188 LBS | BODY MASS INDEX: 25.47 KG/M2 | HEIGHT: 72 IN

## 2025-07-31 DIAGNOSIS — M25.512 ACUTE PAIN OF LEFT SHOULDER: Primary | ICD-10-CM

## 2025-07-31 DIAGNOSIS — M51.360 DEGENERATION OF INTERVERTEBRAL DISC OF LUMBAR REGION WITH DISCOGENIC BACK PAIN: ICD-10-CM

## 2025-07-31 DIAGNOSIS — G89.29 CHRONIC BILATERAL LOW BACK PAIN WITH RIGHT-SIDED SCIATICA: ICD-10-CM

## 2025-07-31 DIAGNOSIS — R29.898 LEFT LEG WEAKNESS: ICD-10-CM

## 2025-07-31 DIAGNOSIS — F43.10 POST TRAUMATIC STRESS DISORDER (PTSD): ICD-10-CM

## 2025-07-31 DIAGNOSIS — F41.9 ANXIETY: ICD-10-CM

## 2025-07-31 DIAGNOSIS — M54.41 CHRONIC BILATERAL LOW BACK PAIN WITH RIGHT-SIDED SCIATICA: ICD-10-CM

## 2025-07-31 DIAGNOSIS — F90.0 ATTENTION DEFICIT HYPERACTIVITY DISORDER (ADHD), PREDOMINANTLY INATTENTIVE TYPE: Primary | ICD-10-CM

## 2025-07-31 DIAGNOSIS — M25.512 ACUTE PAIN OF LEFT SHOULDER: ICD-10-CM

## 2025-07-31 DIAGNOSIS — F33.1 MODERATE EPISODE OF RECURRENT MAJOR DEPRESSIVE DISORDER (HCC): ICD-10-CM

## 2025-07-31 DIAGNOSIS — M75.82 TENDINITIS OF LEFT ROTATOR CUFF: ICD-10-CM

## 2025-07-31 PROCEDURE — 99214 OFFICE O/P EST MOD 30 MIN: CPT | Performed by: EMERGENCY MEDICINE

## 2025-07-31 PROCEDURE — 90792 PSYCH DIAG EVAL W/MED SRVCS: CPT | Performed by: PSYCHIATRY & NEUROLOGY

## 2025-07-31 PROCEDURE — 73030 X-RAY EXAM OF SHOULDER: CPT

## 2025-07-31 RX ORDER — METHYLPREDNISOLONE 4 MG/1
TABLET ORAL
Qty: 1 EACH | Refills: 0 | Status: SHIPPED | OUTPATIENT
Start: 2025-07-31

## 2025-07-31 RX ORDER — DEXTROAMPHETAMINE SACCHARATE, AMPHETAMINE ASPARTATE MONOHYDRATE, DEXTROAMPHETAMINE SULFATE AND AMPHETAMINE SULFATE 6.25; 6.25; 6.25; 6.25 MG/1; MG/1; MG/1; MG/1
25 CAPSULE, EXTENDED RELEASE ORAL EVERY MORNING
Qty: 30 CAPSULE | Refills: 0 | Status: SHIPPED | OUTPATIENT
Start: 2025-07-31 | End: 2025-08-11 | Stop reason: SDUPTHER

## 2025-07-31 RX ORDER — DEXTROAMPHETAMINE SACCHARATE, AMPHETAMINE ASPARTATE, DEXTROAMPHETAMINE SULFATE AND AMPHETAMINE SULFATE 2.5; 2.5; 2.5; 2.5 MG/1; MG/1; MG/1; MG/1
10 TABLET ORAL
Qty: 30 TABLET | Refills: 0 | Status: SHIPPED | OUTPATIENT
Start: 2025-07-31 | End: 2025-08-12

## 2025-08-07 ENCOUNTER — EVALUATION (OUTPATIENT)
Dept: PHYSICAL THERAPY | Facility: CLINIC | Age: 45
End: 2025-08-07
Attending: EMERGENCY MEDICINE
Payer: MEDICARE

## 2025-08-07 DIAGNOSIS — M75.82 TENDINITIS OF LEFT ROTATOR CUFF: ICD-10-CM

## 2025-08-07 DIAGNOSIS — M54.41 CHRONIC BILATERAL LOW BACK PAIN WITH RIGHT-SIDED SCIATICA: ICD-10-CM

## 2025-08-07 DIAGNOSIS — G89.29 CHRONIC BILATERAL LOW BACK PAIN WITH RIGHT-SIDED SCIATICA: ICD-10-CM

## 2025-08-07 DIAGNOSIS — M51.360 DEGENERATION OF INTERVERTEBRAL DISC OF LUMBAR REGION WITH DISCOGENIC BACK PAIN: ICD-10-CM

## 2025-08-07 DIAGNOSIS — M25.512 ACUTE PAIN OF LEFT SHOULDER: ICD-10-CM

## 2025-08-07 DIAGNOSIS — R29.898 LEFT LEG WEAKNESS: ICD-10-CM

## 2025-08-07 PROCEDURE — 97162 PT EVAL MOD COMPLEX 30 MIN: CPT

## 2025-08-07 PROCEDURE — 97110 THERAPEUTIC EXERCISES: CPT

## 2025-08-11 ENCOUNTER — OFFICE VISIT (OUTPATIENT)
Age: 45
End: 2025-08-11
Payer: MEDICARE

## 2025-08-18 ENCOUNTER — OFFICE VISIT (OUTPATIENT)
Dept: PHYSICAL THERAPY | Facility: CLINIC | Age: 45
End: 2025-08-18
Attending: EMERGENCY MEDICINE
Payer: MEDICARE

## 2025-08-18 DIAGNOSIS — M75.82 TENDINITIS OF LEFT ROTATOR CUFF: ICD-10-CM

## 2025-08-18 DIAGNOSIS — M51.360 DEGENERATION OF INTERVERTEBRAL DISC OF LUMBAR REGION WITH DISCOGENIC BACK PAIN: ICD-10-CM

## 2025-08-18 DIAGNOSIS — G89.29 CHRONIC BILATERAL LOW BACK PAIN WITH RIGHT-SIDED SCIATICA: ICD-10-CM

## 2025-08-18 DIAGNOSIS — M54.41 CHRONIC BILATERAL LOW BACK PAIN WITH RIGHT-SIDED SCIATICA: ICD-10-CM

## 2025-08-18 DIAGNOSIS — R29.898 LEFT LEG WEAKNESS: ICD-10-CM

## 2025-08-18 DIAGNOSIS — M25.512 ACUTE PAIN OF LEFT SHOULDER: Primary | ICD-10-CM

## 2025-08-18 PROCEDURE — 97112 NEUROMUSCULAR REEDUCATION: CPT

## 2025-08-18 PROCEDURE — 97140 MANUAL THERAPY 1/> REGIONS: CPT

## 2025-08-18 PROCEDURE — 97110 THERAPEUTIC EXERCISES: CPT

## 2025-08-20 ENCOUNTER — OFFICE VISIT (OUTPATIENT)
Dept: PHYSICAL THERAPY | Facility: CLINIC | Age: 45
End: 2025-08-20
Attending: EMERGENCY MEDICINE
Payer: MEDICARE

## 2025-08-20 DIAGNOSIS — M25.512 ACUTE PAIN OF LEFT SHOULDER: Primary | ICD-10-CM

## 2025-08-20 DIAGNOSIS — G89.29 CHRONIC BILATERAL LOW BACK PAIN WITH RIGHT-SIDED SCIATICA: ICD-10-CM

## 2025-08-20 DIAGNOSIS — R29.898 LEFT LEG WEAKNESS: ICD-10-CM

## 2025-08-20 DIAGNOSIS — M54.41 CHRONIC BILATERAL LOW BACK PAIN WITH RIGHT-SIDED SCIATICA: ICD-10-CM

## 2025-08-20 DIAGNOSIS — M75.82 TENDINITIS OF LEFT ROTATOR CUFF: ICD-10-CM

## 2025-08-20 DIAGNOSIS — M51.360 DEGENERATION OF INTERVERTEBRAL DISC OF LUMBAR REGION WITH DISCOGENIC BACK PAIN: ICD-10-CM

## 2025-08-20 PROCEDURE — 97112 NEUROMUSCULAR REEDUCATION: CPT

## 2025-08-20 PROCEDURE — 97140 MANUAL THERAPY 1/> REGIONS: CPT

## 2025-08-20 PROCEDURE — 97110 THERAPEUTIC EXERCISES: CPT

## 2025-08-22 ENCOUNTER — OFFICE VISIT (OUTPATIENT)
Dept: PSYCHIATRY | Facility: CLINIC | Age: 45
End: 2025-08-22
Payer: COMMERCIAL

## 2025-08-22 DIAGNOSIS — F33.1 MODERATE EPISODE OF RECURRENT MAJOR DEPRESSIVE DISORDER (HCC): Primary | ICD-10-CM

## 2025-08-22 DIAGNOSIS — F32.9 REACTIVE DEPRESSION (SITUATIONAL): ICD-10-CM

## 2025-08-22 DIAGNOSIS — F41.9 ANXIETY: ICD-10-CM

## 2025-08-22 DIAGNOSIS — F43.10 POST TRAUMATIC STRESS DISORDER (PTSD): ICD-10-CM

## 2025-08-22 DIAGNOSIS — G47.00 INSOMNIA, UNSPECIFIED TYPE: ICD-10-CM

## 2025-08-22 DIAGNOSIS — F90.0 ATTENTION DEFICIT HYPERACTIVITY DISORDER (ADHD), PREDOMINANTLY INATTENTIVE TYPE: ICD-10-CM

## 2025-08-22 PROCEDURE — 99214 OFFICE O/P EST MOD 30 MIN: CPT | Performed by: PSYCHIATRY & NEUROLOGY

## 2025-08-22 PROCEDURE — 90833 PSYTX W PT W E/M 30 MIN: CPT | Performed by: PSYCHIATRY & NEUROLOGY

## 2025-08-22 RX ORDER — ESCITALOPRAM OXALATE 10 MG/1
10 TABLET ORAL DAILY
Qty: 30 TABLET | Refills: 5 | Status: SHIPPED | OUTPATIENT
Start: 2025-08-22

## 2025-08-22 RX ORDER — DEXTROAMPHETAMINE SACCHARATE, AMPHETAMINE ASPARTATE MONOHYDRATE, DEXTROAMPHETAMINE SULFATE AND AMPHETAMINE SULFATE 6.25; 6.25; 6.25; 6.25 MG/1; MG/1; MG/1; MG/1
25 CAPSULE, EXTENDED RELEASE ORAL EVERY MORNING
Qty: 30 CAPSULE | Refills: 0 | Status: SHIPPED | OUTPATIENT
Start: 2025-09-10

## 2025-08-22 RX ORDER — HYDROXYZINE HYDROCHLORIDE 25 MG/1
25 TABLET, FILM COATED ORAL 3 TIMES DAILY PRN
Qty: 90 TABLET | Refills: 5 | Status: SHIPPED | OUTPATIENT
Start: 2025-08-22

## 2025-08-22 RX ORDER — DEXTROAMPHETAMINE SACCHARATE, AMPHETAMINE ASPARTATE, DEXTROAMPHETAMINE SULFATE AND AMPHETAMINE SULFATE 1.25; 1.25; 1.25; 1.25 MG/1; MG/1; MG/1; MG/1
5 TABLET ORAL
Qty: 60 TABLET | Refills: 0 | Status: SHIPPED | OUTPATIENT
Start: 2025-08-22

## (undated) DEVICE — GLOVE SRG BIOGEL 7.5

## (undated) DEVICE — GLOVE INDICATOR PI UNDERGLOVE SZ 7.5 BLUE

## (undated) DEVICE — STERILE BETHLEHEM PLASTIC HAND: Brand: CARDINAL HEALTH

## (undated) DEVICE — PREP PAD BNS: Brand: CONVERTORS

## (undated) DEVICE — C-ARM: Brand: UNBRANDED

## (undated) DEVICE — GLOVE INDICATOR PI UNDERGLOVE SZ 8 BLUE

## (undated) DEVICE — INTENDED FOR TISSUE SEPARATION, AND OTHER PROCEDURES THAT REQUIRE A SHARP SURGICAL BLADE TO PUNCTURE OR CUT.: Brand: BARD-PARKER ® CARBON RIB-BACK BLADES

## (undated) DEVICE — ACE WRAP 3 IN UNSTERILE

## (undated) DEVICE — CUFF TOURNIQUET 18 X 4 IN QUICK CONNECT DISP 1 BLADDER